# Patient Record
Sex: MALE | Race: WHITE | NOT HISPANIC OR LATINO | Employment: FULL TIME | URBAN - NONMETROPOLITAN AREA
[De-identification: names, ages, dates, MRNs, and addresses within clinical notes are randomized per-mention and may not be internally consistent; named-entity substitution may affect disease eponyms.]

---

## 2022-04-26 ENCOUNTER — HOSPITAL ENCOUNTER (EMERGENCY)
Facility: HOSPITAL | Age: 22
Discharge: HOME | End: 2022-04-26
Attending: EMERGENCY MEDICINE | Admitting: EMERGENCY MEDICINE
Payer: COMMERCIAL

## 2022-04-26 VITALS
RESPIRATION RATE: 18 BRPM | HEART RATE: 115 BPM | WEIGHT: 283.95 LBS | BODY MASS INDEX: 38.46 KG/M2 | HEIGHT: 72 IN | DIASTOLIC BLOOD PRESSURE: 83 MMHG | OXYGEN SATURATION: 98 % | SYSTOLIC BLOOD PRESSURE: 139 MMHG | TEMPERATURE: 97.3 F

## 2022-04-26 DIAGNOSIS — M54.6 THORACIC BACK PAIN, UNSPECIFIED BACK PAIN LATERALITY, UNSPECIFIED CHRONICITY: Primary | ICD-10-CM

## 2022-04-26 PROCEDURE — 99283 EMERGENCY DEPT VISIT LOW MDM: CPT

## 2022-04-26 PROCEDURE — 99282 EMERGENCY DEPT VISIT SF MDM: CPT | Performed by: EMERGENCY MEDICINE

## 2022-04-26 RX ORDER — ACETAMINOPHEN 325 MG/1
650 TABLET ORAL ONCE
Status: COMPLETED | OUTPATIENT
Start: 2022-04-26 | End: 2022-04-26

## 2022-04-26 RX ORDER — LIDOCAINE 50 MG/G
1 PATCH TOPICAL ONCE
Status: DISCONTINUED | OUTPATIENT
Start: 2022-04-26 | End: 2022-04-27 | Stop reason: HOSPADM

## 2022-04-26 RX ORDER — LIDOCAINE 50 MG/G
1 PATCH TOPICAL DAILY
Qty: 10 PATCH | Refills: 0 | Status: SHIPPED | OUTPATIENT
Start: 2022-04-26

## 2022-04-26 RX ADMIN — ACETAMINOPHEN 650 MG: 325 TABLET ORAL at 22:03

## 2022-04-26 RX ADMIN — LIDOCAINE 5% 1 PATCH: 700 PATCH TOPICAL at 22:13

## 2022-04-26 NOTE — Clinical Note
Tyrese Fonseca was seen and treated in our emergency department on 4/26/2022  Diagnosis:     Kim Vick  may return to work on return date  He may return on this date: 04/27/2022         If you have any questions or concerns, please don't hesitate to call        Anabella Mosquera MD    ______________________________           _______________          _______________  Hospital Representative                              Date                                Time

## 2022-04-27 NOTE — DISCHARGE INSTRUCTIONS
Thank you for letting us take care of you  You have been evaluated for back pain  Please follow-up as instructed  Please return for worsening symptoms  At this time, you have no clinical evidence of symptoms or problems that will require hospitalization, however you should be evaluated soon by a primary care physician, and contact information has been provided  Follow up with your primary care physician  This is important as many medical conditions can be managed as an outpatient, in addition to routine health screening  Seeing your primary doctor often can help identify changes in the medical issue that brought you to the ED for care today  If you experience any new symptoms or acute worsening of current symptoms, please return to the ED

## 2022-04-27 NOTE — ED PROVIDER NOTES
History  Chief Complaint   Patient presents with    Back Pain     Patient complaining of back pain between shoulder blades for 3 days   76-year-old male without pertinent past medical history who presents for back pain between the shoulder blades for the last 3 days  States he has no injuries or trauma  Denies any heavy lifting  States pain is worse when he tries to twist his back and believes he may have a muscle sprain or strain  Did have back issues last year but it was in the lumbar back  Denies any numbness or tingling  No weakness  Denies any headache  States that when he told work he would not be able to come in due to back pain, they instructed that he would need a doctor's note prompting him to come here  Did not take anything for pain prior to arrival   No bowel or bladder incontinence  No saddle anesthesia  Denies any previous IV drug use  No other concerns  None       History reviewed  No pertinent past medical history  History reviewed  No pertinent surgical history  History reviewed  No pertinent family history  I have reviewed and agree with the history as documented  E-Cigarette/Vaping    E-Cigarette Use Current Every Day User      E-Cigarette/Vaping Substances     Social History     Tobacco Use    Smoking status: Never Smoker    Smokeless tobacco: Never Used   Vaping Use    Vaping Use: Every day   Substance Use Topics    Alcohol use: Not Currently    Drug use: Not Currently       Review of Systems   Constitutional: Negative for activity change, appetite change, chills, diaphoresis and fever  HENT: Negative for congestion, rhinorrhea and sore throat  Eyes: Negative for visual disturbance  Respiratory: Negative for chest tightness and shortness of breath  Cardiovascular: Negative for chest pain, palpitations and leg swelling  Gastrointestinal: Negative for abdominal pain, constipation, diarrhea, nausea and vomiting     Genitourinary: Negative for difficulty urinating and hematuria  Musculoskeletal: Positive for back pain  Negative for gait problem, joint swelling and neck pain  Skin: Negative for color change and rash  Neurological: Negative for dizziness, weakness and headaches  Psychiatric/Behavioral: Negative for behavioral problems  Physical Exam  Physical Exam  Vitals and nursing note reviewed  Constitutional:       General: He is not in acute distress  Appearance: He is well-developed  He is not diaphoretic  HENT:      Head: Normocephalic and atraumatic  Right Ear: External ear normal       Left Ear: External ear normal       Nose: Nose normal    Eyes:      Pupils: Pupils are equal, round, and reactive to light  Cardiovascular:      Rate and Rhythm: Regular rhythm  Tachycardia present  Pulses: Normal pulses  Heart sounds: Normal heart sounds  Pulmonary:      Effort: Pulmonary effort is normal  No respiratory distress  Breath sounds: Normal breath sounds  No wheezing or rales  Abdominal:      General: Bowel sounds are normal       Palpations: Abdomen is soft  There is no mass  Tenderness: There is no abdominal tenderness  Musculoskeletal:         General: Tenderness present  No deformity  Normal range of motion  Cervical back: Normal range of motion and neck supple  Comments: No midline spinal tenderness to palpation; patient has pain in the paraspinal muscles in the thoracic region; worse pain with movement and with twisting reported  No rashes noted  Skin:     General: Skin is warm and dry  Capillary Refill: Capillary refill takes less than 2 seconds  Findings: No erythema or rash  Comments: 2+ radial pulses bilaterally     Neurological:      General: No focal deficit present  Mental Status: He is alert  Mental status is at baseline  Cranial Nerves: No cranial nerve deficit  Sensory: No sensory deficit  Motor: No weakness or abnormal muscle tone  Comments: 5/5 strength in upper extremities bilaterally; normal sensation throughout the upper extremities bilaterally  Psychiatric:         Behavior: Behavior normal          Vital Signs  ED Triage Vitals [04/26/22 2154]   Temperature Pulse Respirations Blood Pressure SpO2   (!) 97 3 °F (36 3 °C) (!) 115 18 139/83 98 %      Temp Source Heart Rate Source Patient Position - Orthostatic VS BP Location FiO2 (%)   Temporal Monitor Sitting Left arm --      Pain Score       7           Vitals:    04/26/22 2154   BP: 139/83   Pulse: (!) 115   Patient Position - Orthostatic VS: Sitting         Visual Acuity      ED Medications  Medications   lidocaine (LIDODERM) 5 % patch 1 patch (has no administration in time range)   acetaminophen (TYLENOL) tablet 650 mg (650 mg Oral Given 4/26/22 2203)       Diagnostic Studies  Results Reviewed     None                 No orders to display              Procedures  Procedures         ED Course         MDM  Number of Diagnoses or Management Options  Thoracic back pain, unspecified back pain laterality, unspecified chronicity  Diagnosis management comments: 81-year-old male without pertinent past medical history who presents for back pain between the shoulder blades for the last 3 days  Vital signs on arrival here were notable for mild tachycardia but otherwise unremarkable vitals  Patient has exam as above  No red flag signs or symptoms  Exam unremarkable work and likely muscle strain/sprain  No imaging indicated  Patient was given Tylenol and Lidoderm patch here  Advised continue NSAIDs and Tylenol at home  Lidoderm patches prescribed patient given work note  Understands return precautions         Amount and/or Complexity of Data Reviewed  Review and summarize past medical records: yes    Risk of Complications, Morbidity, and/or Mortality  Presenting problems: low  Diagnostic procedures: low  Management options: low        Disposition  Final diagnoses:   Thoracic back pain, unspecified back pain laterality, unspecified chronicity     Time reflects when diagnosis was documented in both MDM as applicable and the Disposition within this note     Time User Action Codes Description Comment    4/26/2022  9:56 PM Beny Harrell Add [M54 6] Thoracic back pain, unspecified back pain laterality, unspecified chronicity       ED Disposition     ED Disposition Condition Date/Time Comment    Discharge Stable Tue Apr 26, 2022  9:56 PM Kristal Sinclair discharge to home/self care  Follow-up Information     Follow up With Specialties Details Why Contact Info    Your Family Physician              Patient's Medications   Discharge Prescriptions    LIDOCAINE (LIDODERM) 5 %    Apply 1 patch topically daily Remove & Discard patch within 12 hours or as directed by MD       Start Date: 4/26/2022 End Date: --       Order Dose: 1 patch       Quantity: 10 patch    Refills: 0       No discharge procedures on file      PDMP Review     None          ED Provider  Electronically Signed by           Donna Dorsey MD  04/26/22 2200

## 2022-08-16 ENCOUNTER — HOSPITAL ENCOUNTER (EMERGENCY)
Facility: HOSPITAL | Age: 22
Discharge: HOME | End: 2022-08-16
Attending: EMERGENCY MEDICINE | Admitting: EMERGENCY MEDICINE
Payer: COMMERCIAL

## 2022-08-16 VITALS
RESPIRATION RATE: 16 BRPM | TEMPERATURE: 98 F | SYSTOLIC BLOOD PRESSURE: 159 MMHG | OXYGEN SATURATION: 98 % | DIASTOLIC BLOOD PRESSURE: 94 MMHG | WEIGHT: 285 LBS | HEART RATE: 98 BPM | BODY MASS INDEX: 38.65 KG/M2

## 2022-08-16 DIAGNOSIS — M79.645 THUMB PAIN, LEFT: Primary | ICD-10-CM

## 2022-08-16 PROCEDURE — 99282 EMERGENCY DEPT VISIT SF MDM: CPT | Performed by: EMERGENCY MEDICINE

## 2022-08-16 PROCEDURE — 99283 EMERGENCY DEPT VISIT LOW MDM: CPT

## 2022-08-16 NOTE — Clinical Note
Shamatristan Fernandez was seen and treated in our emergency department on 8/16/2022  Diagnosis:     Veda Chavez  may return to work on return date  He may return on this date: 08/17/2022         If you have any questions or concerns, please don't hesitate to call        Pina Villeda, DO    ______________________________           _______________          _______________  Hospital Representative                              Date                                Time

## 2022-08-16 NOTE — ED PROVIDER NOTES
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 Taking?   lidocaine (Lidoderm) 5 %   No No   Sig: Apply 1 patch topically daily Remove & Discard patch within 12 hours or as directed by MD      Facility-Administered Medications: None       History reviewed  No pertinent past medical history  History reviewed  No pertinent surgical history  History reviewed  No pertinent family history  I have reviewed and agree with the history as documented  E-Cigarette/Vaping    E-Cigarette Use Current Every Day User      E-Cigarette/Vaping Substances     Social History     Tobacco Use    Smoking status: Never Smoker    Smokeless tobacco: Never Used   Vaping Use    Vaping Use: Every day   Substance Use Topics    Alcohol use: Not Currently    Drug use: Not Currently       Review of Systems   Constitutional: Negative  HENT: Negative  Eyes: Negative  Respiratory: Negative  Cardiovascular: Negative  Gastrointestinal: Negative  Endocrine: Negative  Genitourinary: Negative  Musculoskeletal: Positive for arthralgias and myalgias  Skin: Negative  Allergic/Immunologic: Negative  Neurological: Negative  Hematological: Negative  Psychiatric/Behavioral: Negative  Physical Exam  Physical Exam  Constitutional:       Appearance: He is well-developed  HENT:      Head: Normocephalic and atraumatic  Eyes:      Conjunctiva/sclera: Conjunctivae normal       Pupils: Pupils are equal, round, and reactive to light  Cardiovascular:      Rate and Rhythm: Normal rate  Pulmonary:      Effort: Pulmonary effort is normal    Abdominal:      Palpations: Abdomen is soft  Musculoskeletal:         General: Normal range of motion  Arms:       Cervical back: Normal range of motion and neck supple  Comments: Tenderness to palpate at the base of the left thumb, both dorsally and on the palmar surface, there is no bony deformity, distal sensation and motor is intact with brisk capillary refill, 2+ radial pulses, patient reports pain with range of motion testing   Skin:     General: Skin is warm and dry  Neurological:      Mental Status: He is alert and oriented to person, place, and time           Vital Signs  ED Triage Vitals [08/16/22 1456]   Temperature Pulse Respirations Blood Pressure SpO2   98 °F (36 7 °C) 98 16 159/94 98 %      Temp src Heart Rate Source Patient Position - Orthostatic VS BP Location FiO2 (%)   -- -- Lying Right arm --      Pain Score       7           Vitals:    08/16/22 1456   BP: 159/94   Pulse: 98   Patient Position - Orthostatic VS: Lying               ED Medications  Medications - No data to display    Diagnostic Studies  Results Reviewed     None                 No orders to display                   ED Course  ED Course as of 08/16/22 1534   Tue Aug 16, 2022   1533 Patient with acute exacerbation of chronic left thumb pain, has not taken anything at home for the pain, is requesting work note as he believes work exacerbated his pain, he is requesting a work note, this was provided as well as instructions to take OTC anti-inflammatories, apply ice, rest thumb until feeling better, a referral was also placed for follow-up with Sports Medicine for further evaluation and treatment, patient acknowledges understanding and agreement with this plan                                             Disposition  Final diagnoses:   Thumb pain, left     Time reflects when diagnosis was documented in both MDM as applicable and the Disposition within this note     Time User Action Codes Description Comment    8/16/2022  3:08 PM Hugo Rocha Add [A72 860] Thumb pain, left       ED Disposition     ED Disposition   Discharge    Condition   Stable    Date/Time   Tue Aug 16, 2022  3:08 PM    Comment   Lawerance Aw discharge to home/self care                 Follow-up Information     Follow up With Specialties Details Why Contact Info    Jacki Ceballos MD Sports Medicine In 2 days As needed [address]  [phone]            Discharge Medication List as of 8/16/2022  3:14 PM      CONTINUE these medications which have NOT CHANGED    Details   lidocaine (Lidoderm) 5 % Apply 1 patch topically daily Remove & Discard patch within 12 hours or as directed by MD, Starting Tue 4/26/2022, Normal                 PDMP Review     None          ED Provider  Electronically Signed by           Melissa Huertas DO  08/16/22 8108

## 2022-08-16 NOTE — Clinical Note
Ruddy Ordaz was seen and treated in our emergency department on 8/16/2022  Diagnosis:     Enio Cordova  may return to work on return date  He may return on this date: 08/17/2022         If you have any questions or concerns, please don't hesitate to call        Pinky Persaud DO    ______________________________           _______________          _______________  Hospital Representative                              Date                                Time

## 2023-02-27 ENCOUNTER — OFFICE VISIT (OUTPATIENT)
Dept: URGENT CARE | Facility: CLINIC | Age: 23
End: 2023-02-27

## 2023-02-27 VITALS
DIASTOLIC BLOOD PRESSURE: 77 MMHG | HEIGHT: 72 IN | RESPIRATION RATE: 20 BRPM | OXYGEN SATURATION: 97 % | HEART RATE: 130 BPM | BODY MASS INDEX: 35.62 KG/M2 | SYSTOLIC BLOOD PRESSURE: 129 MMHG | TEMPERATURE: 101.6 F | WEIGHT: 263 LBS

## 2023-02-27 DIAGNOSIS — R50.9 FEVER, UNSPECIFIED FEVER CAUSE: ICD-10-CM

## 2023-02-27 DIAGNOSIS — M54.50 ACUTE BILATERAL LOW BACK PAIN WITHOUT SCIATICA: Primary | ICD-10-CM

## 2023-02-27 DIAGNOSIS — K12.0 APHTHOUS ULCER: ICD-10-CM

## 2023-02-27 LAB
SARS-COV-2 AG UPPER RESP QL IA: NEGATIVE
SL AMB  POCT GLUCOSE, UA: NEGATIVE
SL AMB LEUKOCYTE ESTERASE,UA: NEGATIVE
SL AMB POCT BILIRUBIN,UA: NEGATIVE
SL AMB POCT BLOOD,UA: NEGATIVE
SL AMB POCT CLARITY,UA: CLEAR
SL AMB POCT COLOR,UA: YELLOW
SL AMB POCT KETONES,UA: NEGATIVE
SL AMB POCT NITRITE,UA: NEGATIVE
SL AMB POCT PH,UA: 7
SL AMB POCT SPECIFIC GRAVITY,UA: 1015
SL AMB POCT URINE PROTEIN: NEGATIVE
SL AMB POCT UROBILINOGEN: 0.2
VALID CONTROL: NORMAL

## 2023-02-27 RX ORDER — MELOXICAM 15 MG/1
15 TABLET ORAL DAILY
Qty: 7 TABLET | Refills: 0 | Status: SHIPPED | OUTPATIENT
Start: 2023-02-27 | End: 2023-03-06

## 2023-02-27 RX ORDER — LIDOCAINE 50 MG/G
1 PATCH TOPICAL DAILY
Qty: 10 PATCH | Refills: 0 | Status: SHIPPED | OUTPATIENT
Start: 2023-02-27

## 2023-02-27 NOTE — PATIENT INSTRUCTIONS
Fever in Adults   WHAT YOU NEED TO KNOW:   A fever is an increase in your body temperature  Normal body temperature is 98 6°F (37°C)  Fever is generally defined as greater than 100 4°F (38°C)  Common causes include an infection, injury, or disease such as arthritis  DISCHARGE INSTRUCTIONS:   Return to the emergency department if:   Your fever does not go away or gets worse even after treatment  You have a stiff neck and a bad headache  You are confused  You may not be able to think clearly or remember things like you normally do  Your heart beats faster than usual even after treatment  You have shortness of breath or chest pain when you breathe  You urinate small amounts or not at all  Your skin, lips, or nails turn blue  Contact your healthcare provider if:   You have abdominal pain or you feel bloated  You have nausea or are vomiting  You have pain or burning when you urinate, or you have pain in your back  You have questions or concerns about your condition or care  Medicines: You may need any of the following:  NSAIDs , such as ibuprofen, help decrease swelling, pain, and fever  This medicine is available with or without a doctor's order  NSAIDs can cause stomach bleeding or kidney problems in certain people  If you take blood thinner medicine, always ask if NSAIDs are safe for you  Always read the medicine label and follow directions  Do not give these medicines to children younger than 6 months without direction from a healthcare provider  Acetaminophen  decreases pain and fever  It is available without a doctor's order  Ask how much to take and how often to take it  Follow directions  Read the labels of all other medicines you are using to see if they also contain acetaminophen, or ask your doctor or pharmacist  Acetaminophen can cause liver damage if not taken correctly  Antibiotics  may be given if you have an infection caused by bacteria      Take your medicine as directed  Contact your healthcare provider if you think your medicine is not helping or if you have side effects  Tell your provider if you are allergic to any medicine  Keep a list of the medicines, vitamins, and herbs you take  Include the amounts, and when and why you take them  Bring the list or the pill bottles to follow-up visits  Carry your medicine list with you in case of an emergency  Follow up with your healthcare provider as directed:  Write down your questions so you remember to ask them during your visits  Self-care:   Drink more liquids as directed  A fever makes you sweat  This can increase your risk for dehydration  Liquids can help prevent dehydration  Drink at least 6 to 8 eight-ounce cups of clear liquids each day  Drink water, juice, or broth  Do not drink sports drinks  They may contain caffeine  Ask your healthcare provider if you should drink an oral rehydration solution (ORS)  An ORS has the right amounts of water, salts, and sugar you need to replace body fluids  Dress in lightweight clothes  Shivers may be a sign that your fever is rising  Do not put extra blankets or clothes on  This may cause your fever to rise even higher  Dress in light, comfortable clothing  Use a lightweight blanket or sheet when you sleep  Change your clothes, blanket, or sheets if they get wet  Cool yourself safely  Take a bath in cool or lukewarm water  Use an ice pack wrapped in a small towel or wet a washcloth with cool water  Place the ice pack or wet washcloth on your forehead or the back of your neck  © Copyright Graham [employer] 2022 Information is for End User's use only and may not be sold, redistributed or otherwise used for commercial purposes  The above information is an  only  It is not intended as medical advice for individual conditions or treatments   Talk to your doctor, nurse or pharmacist before following any medical regimen to see if it is safe and effective for you   Acute Low Back Pain   WHAT YOU NEED TO KNOW:   Acute low back pain is sudden discomfort that lasts up to 6 weeks and makes activity difficult  DISCHARGE INSTRUCTIONS:   Return to the emergency department if:   You have severe pain  You have sudden stiffness and heaviness on both buttocks down to both legs  You have numbness or weakness in one leg, or pain in both legs  You have numbness in your genital area or across your lower back  You cannot control your urine or bowel movements  Call your doctor if:   You have a fever  You have pain at night or when you rest     Your pain does not get better with treatment  You have pain that worsens when you cough or sneeze  You suddenly feel something pop or snap in your back  You have questions or concerns about your condition or care  Medicines: You may need any of the following:  NSAIDs , such as ibuprofen, help decrease swelling, pain, and fever  This medicine is available with or without a doctor's order  NSAIDs can cause stomach bleeding or kidney problems in certain people  If you take blood thinner medicine, always ask your healthcare provider if NSAIDs are safe for you  Always read the medicine label and follow directions  Acetaminophen  decreases pain and fever  It is available without a doctor's order  Ask how much to take and how often to take it  Follow directions  Read the labels of all other medicines you are using to see if they also contain acetaminophen, or ask your doctor or pharmacist  Acetaminophen can cause liver damage if not taken correctly  Muscle relaxers  decrease pain by relaxing the muscles in your lower spine  Prescription pain medicine  may be given  Ask your healthcare provider how to take this medicine safely  Some prescription pain medicines contain acetaminophen  Do not take other medicines that contain acetaminophen without talking to your healthcare provider   Too much acetaminophen may cause liver damage  Prescription pain medicine may cause constipation  Ask your healthcare provider how to prevent or treat constipation  Self-care:   Stay active  as much as you can without causing more pain  Bed rest could make your back pain worse  Start with some light exercises, such as walking  Avoid heavy lifting until your pain is gone  Ask for more information about the activities or exercises that are right for you  Apply heat  on your back for 20 to 30 minutes every 2 hours for as many days as directed  Heat helps decrease pain and muscle spasms  Alternate heat and ice  Apply ice  on your back for 15 to 20 minutes every hour or as directed  Use an ice pack, or put crushed ice in a plastic bag  Cover it with a towel before you apply it to your skin  Ice helps prevent tissue damage and decreases swelling and pain  Prevent acute low back pain:   Use proper body mechanics  Bend at the hips and knees when you  objects  Do not bend from the waist  Use your leg muscles as you lift the load  Do not use your back  Keep the object close to your chest as you lift it  Try not to twist or lift anything above your waist          Change your position often when you stand for long periods of time  Rest one foot on a small box or footrest, and then switch to the other foot often  Try not to sit for long periods of time  When you do, sit in a straight-backed chair with your feet flat on the floor  Never reach, pull, or push while you are sitting  Do exercises that strengthen your back muscles  Warm up before you exercise  Ask your healthcare provider the best exercises for you  Maintain a healthy weight  Ask your healthcare provider what a healthy weight is for you  Ask him or her to help you create a weight loss plan if you are overweight  Follow up with your doctor as directed:  Return for a follow-up visit if you still have pain after 1 to 3 weeks of treatment   You may need to visit an orthopedist if your back pain lasts longer than 12 weeks  Write down your questions so you remember to ask them during your visits  © Copyright Jean-Paul [employer]ender 2022 Information is for End User's use only and may not be sold, redistributed or otherwise used for commercial purposes  The above information is an  only  It is not intended as medical advice for individual conditions or treatments  Talk to your doctor, nurse or pharmacist before following any medical regimen to see if it is safe and effective for you

## 2023-02-27 NOTE — LETTER
February 27, 2023     Patient: Neto West   YOB: 2000   Date of Visit: 2/27/2023       To Whom it May Concern:    Hector Tatyana was seen in my clinic on 2/27/2023  He may return to work on 02/28/2023  If you have any questions or concerns, please don't hesitate to call           Sincerely,          Roc Hanson PA-C        CC: No Recipients

## 2023-02-27 NOTE — PROGRESS NOTES
[address] [facility]        NAME: Chery Sanabria is a 25 y o  male  : 2000    MRN: [id number]  DATE: 2023  TIME: 3:19 PM    Assessment and Plan   Acute bilateral low back pain without sciatica [M54 50]  1  Acute bilateral low back pain without sciatica  POCT urine dip    lidocaine (Lidoderm) 5 %    meloxicam (Mobic) 15 mg tablet      2  Fever, unspecified fever cause  Poct Covid 19 Rapid Antigen Test      3  Aphthous ulcer              Patient Instructions   Patient Instructions     Fever in Adults   WHAT YOU NEED TO KNOW:   A fever is an increase in your body temperature  Normal body temperature is 98 6°F (37°C)  Fever is generally defined as greater than 100 4°F (38°C)  Common causes include an infection, injury, or disease such as arthritis  DISCHARGE INSTRUCTIONS:   Return to the emergency department if:   • Your fever does not go away or gets worse even after treatment  • You have a stiff neck and a bad headache  • You are confused  You may not be able to think clearly or remember things like you normally do  • Your heart beats faster than usual even after treatment  • You have shortness of breath or chest pain when you breathe  • You urinate small amounts or not at all  • Your skin, lips, or nails turn blue  Contact your healthcare provider if:   • You have abdominal pain or you feel bloated  • You have nausea or are vomiting  • You have pain or burning when you urinate, or you have pain in your back  • You have questions or concerns about your condition or care  Medicines: You may need any of the following:  • NSAIDs , such as ibuprofen, help decrease swelling, pain, and fever  This medicine is available with or without a doctor's order  NSAIDs can cause stomach bleeding or kidney problems in certain people  If you take blood thinner medicine, always ask if NSAIDs are safe for you  Always read the medicine label and follow directions   Do not give these medicines to children younger than 6 months without direction from a healthcare provider  • Acetaminophen  decreases pain and fever  It is available without a doctor's order  Ask how much to take and how often to take it  Follow directions  Read the labels of all other medicines you are using to see if they also contain acetaminophen, or ask your doctor or pharmacist  Acetaminophen can cause liver damage if not taken correctly  • Antibiotics  may be given if you have an infection caused by bacteria  • Take your medicine as directed  Contact your healthcare provider if you think your medicine is not helping or if you have side effects  Tell your provider if you are allergic to any medicine  Keep a list of the medicines, vitamins, and herbs you take  Include the amounts, and when and why you take them  Bring the list or the pill bottles to follow-up visits  Carry your medicine list with you in case of an emergency  Follow up with your healthcare provider as directed:  Write down your questions so you remember to ask them during your visits  Self-care:   1  Drink more liquids as directed  A fever makes you sweat  This can increase your risk for dehydration  Liquids can help prevent dehydration  ? Drink at least 6 to 8 eight-ounce cups of clear liquids each day  Drink water, juice, or broth  Do not drink sports drinks  They may contain caffeine  ? Ask your healthcare provider if you should drink an oral rehydration solution (ORS)  An ORS has the right amounts of water, salts, and sugar you need to replace body fluids  2  Dress in lightweight clothes  Shivers may be a sign that your fever is rising  Do not put extra blankets or clothes on  This may cause your fever to rise even higher  Dress in light, comfortable clothing  Use a lightweight blanket or sheet when you sleep  Change your clothes, blanket, or sheets if they get wet  3  Cool yourself safely    Take a bath in cool or lukewarm water  Use an ice pack wrapped in a small towel or wet a washcloth with cool water  Place the ice pack or wet washcloth on your forehead or the back of your neck  © Copyright [employer] 2022 Information is for End User's use only and may not be sold, redistributed or otherwise used for commercial purposes  The above information is an  only  It is not intended as medical advice for individual conditions or treatments  Talk to your doctor, nurse or pharmacist before following any medical regimen to see if it is safe and effective for you  Acute Low Back Pain   WHAT YOU NEED TO KNOW:   Acute low back pain is sudden discomfort that lasts up to 6 weeks and makes activity difficult  DISCHARGE INSTRUCTIONS:   Return to the emergency department if:   • You have severe pain  • You have sudden stiffness and heaviness on both buttocks down to both legs  • You have numbness or weakness in one leg, or pain in both legs  • You have numbness in your genital area or across your lower back  • You cannot control your urine or bowel movements  Call your doctor if:   • You have a fever  • You have pain at night or when you rest     • Your pain does not get better with treatment  • You have pain that worsens when you cough or sneeze  • You suddenly feel something pop or snap in your back  • You have questions or concerns about your condition or care  Medicines: You may need any of the following:  • NSAIDs , such as ibuprofen, help decrease swelling, pain, and fever  This medicine is available with or without a doctor's order  NSAIDs can cause stomach bleeding or kidney problems in certain people  If you take blood thinner medicine, always ask your healthcare provider if NSAIDs are safe for you  Always read the medicine label and follow directions  • Acetaminophen  decreases pain and fever  It is available without a doctor's order  Ask how much to take and how often to take it   Follow directions  Read the labels of all other medicines you are using to see if they also contain acetaminophen, or ask your doctor or pharmacist  Acetaminophen can cause liver damage if not taken correctly  • Muscle relaxers  decrease pain by relaxing the muscles in your lower spine  • Prescription pain medicine  may be given  Ask your healthcare provider how to take this medicine safely  Some prescription pain medicines contain acetaminophen  Do not take other medicines that contain acetaminophen without talking to your healthcare provider  Too much acetaminophen may cause liver damage  Prescription pain medicine may cause constipation  Ask your healthcare provider how to prevent or treat constipation  Self-care:   • Stay active  as much as you can without causing more pain  Bed rest could make your back pain worse  Start with some light exercises, such as walking  Avoid heavy lifting until your pain is gone  Ask for more information about the activities or exercises that are right for you  • Apply heat  on your back for 20 to 30 minutes every 2 hours for as many days as directed  Heat helps decrease pain and muscle spasms  Alternate heat and ice  • Apply ice  on your back for 15 to 20 minutes every hour or as directed  Use an ice pack, or put crushed ice in a plastic bag  Cover it with a towel before you apply it to your skin  Ice helps prevent tissue damage and decreases swelling and pain  Prevent acute low back pain:   1  Use proper body mechanics  ? Bend at the hips and knees when you  objects  Do not bend from the waist  Use your leg muscles as you lift the load  Do not use your back  Keep the object close to your chest as you lift it  Try not to twist or lift anything above your waist          ? Change your position often when you stand for long periods of time  Rest one foot on a small box or footrest, and then switch to the other foot often      ? Try not to sit for long periods of time  When you do, sit in a straight-backed chair with your feet flat on the floor  Never reach, pull, or push while you are sitting  2  Do exercises that strengthen your back muscles  Warm up before you exercise  Ask your healthcare provider the best exercises for you  3  Maintain a healthy weight  Ask your healthcare provider what a healthy weight is for you  Ask him or her to help you create a weight loss plan if you are overweight  Follow up with your doctor as directed:  Return for a follow-up visit if you still have pain after 1 to 3 weeks of treatment  You may need to visit an orthopedist if your back pain lasts longer than 12 weeks  Write down your questions so you remember to ask them during your visits  © Copyright [employer] 2022 Information is for End User's use only and may not be sold, redistributed or otherwise used for commercial purposes  The above information is an  only  It is not intended as medical advice for individual conditions or treatments  Talk to your doctor, nurse or pharmacist before following any medical regimen to see if it is safe and effective for you  Follow up with PCP in 3-5 days  Proceed to  ER if symptoms worsen  Chief Complaint     Chief Complaint   Patient presents with   • Back Pain         History of Present Illness       The patient is a 42-year-old male with past medical history significant for attention deficit disorder and marijuana use who presents to the clinic complaining of lower back pain that started last night  He states that he noticed the pain after he was splitting wood at his house  The patient describes the pain as a sharp pain located in the center of his lower back  He states the pain is intermittent and seems to be worse with movement  He states the pain does not radiate across his back or down his legs    He was also noted to have a fever when he came into the office but currently denies any URI symptoms other than a slight stuffy nose  He currently denies cough, chest pains, shortness of breath, nausea, vomiting, diarrhea, or urinary symptoms he denies history of kidney stones or kidney infections  He states he has had upper back pain in the past which he was seen in the ER for and was given Lidoderm which did seem to help with his symptoms  He denies other drug use and states that he has not used marijuana in several weeks  Review of Systems   Review of Systems   Constitutional: Positive for chills  Negative for fever  HENT: Positive for congestion  Negative for ear pain, hearing loss, mouth sores, nosebleeds, postnasal drip, rhinorrhea, sinus pressure, sinus pain and sore throat  He is complaining about an ulcer in his mouth  Eyes: Negative for pain and visual disturbance  Respiratory: Negative for cough, shortness of breath, wheezing and stridor  Cardiovascular: Negative for chest pain and palpitations  Gastrointestinal: Negative for abdominal pain, anal bleeding, blood in stool, constipation, diarrhea, nausea, rectal pain and vomiting  Genitourinary: Negative for difficulty urinating, dysuria, enuresis, flank pain, frequency, genital sores and hematuria  Musculoskeletal: Positive for back pain  Negative for arthralgias, myalgias, neck pain and neck stiffness  Skin: Negative for color change and rash  Neurological: Negative for seizures and syncope  All other systems reviewed and are negative          Current Medications       Current Outpatient Medications:   •  lidocaine (Lidoderm) 5 %, Apply 1 patch topically over 12 hours daily Remove & Discard patch within 12 hours or as directed by MD, Disp: 10 patch, Rfl: 0  •  meloxicam (Mobic) 15 mg tablet, Take 1 tablet (15 mg total) by mouth daily for 7 days, Disp: 7 tablet, Rfl: 0    Current Allergies     Allergies as of 02/27/2023   • (No Known Allergies)            The following portions of the patient's history were reviewed and updated as appropriate: allergies, current medications, past family history, past medical history, past social history, past surgical history and problem list      History reviewed  No pertinent past medical history  History reviewed  No pertinent surgical history  History reviewed  No pertinent family history  Medications have been verified  Objective   /77   Pulse (!) 130   Temp (!) 101 6 °F (38 7 °C)   Resp 20   Ht 6' (1 829 m)   Wt 119 kg (263 lb)   SpO2 97%   BMI 35 67 kg/m²        Physical Exam     Physical Exam  Constitutional:       General: He is not in acute distress  Appearance: He is well-developed  He is diaphoretic  He is not ill-appearing or toxic-appearing  Comments: The patient is febrile and diaphoretic  He did is not appear in any distress  He does not currently appear toxic   HENT:      Head: Normocephalic  Right Ear: Tympanic membrane normal       Left Ear: Tympanic membrane normal       Nose: Congestion present  Mouth/Throat:      Tongue: Lesions present  Pharynx: No oropharyngeal exudate or posterior oropharyngeal erythema  Comments: - There is a small aphthous ulcer noted on the right tongue  There is no abscess or signs of infection noted in this area that could be accounting for the patient's fever  There are no signs of tonsillar abscess or other ulcers or infection in the mouth  Eyes:      General:         Left eye: No discharge  Pupils: Pupils are equal, round, and reactive to light  Neck:      Thyroid: No thyromegaly  Trachea: No tracheal deviation  Cardiovascular:      Rate and Rhythm: Regular rhythm  Tachycardia present  Heart sounds: No murmur heard  Pulmonary:      Effort: Pulmonary effort is normal  No respiratory distress  Breath sounds: No wheezing or rales  Chest:      Chest wall: No tenderness  Abdominal:      General: Bowel sounds are normal  There is no distension  Palpations: Abdomen is soft  There is no mass  Tenderness: There is generalized abdominal tenderness  There is right CVA tenderness  There is no guarding or rebound  Negative signs include Ivory's sign, Rovsing's sign, McBurney's sign, psoas sign and obturator sign  Comments: Mild bilateral flank pain but no significant guarding or rebound tenderness  Musculoskeletal:         General: Normal range of motion  Cervical back: Normal range of motion  Skin:     General: Skin is warm  Neurological:      Mental Status: He is alert            -Since the patient states that the Lidoderm helped with his symptoms for his back pain in the past I will refill his Lidoderm  I will give him Mobic for his back pain as well  The source of the patient's fever is unknown at this time  His COVID test came back negative his urine looks normal   I suggest that he monitor his symptoms  He will need further work-up for his fever if he develops any further symptoms  He was instructed to follow-up with his primary care doctor or go to the ER if symptoms worsen

## 2023-04-24 ENCOUNTER — CONSULT (OUTPATIENT)
Dept: FAMILY MEDICINE CLINIC | Facility: CLINIC | Age: 23
End: 2023-04-24

## 2023-04-24 VITALS
SYSTOLIC BLOOD PRESSURE: 134 MMHG | BODY MASS INDEX: 36.16 KG/M2 | DIASTOLIC BLOOD PRESSURE: 82 MMHG | HEIGHT: 72 IN | WEIGHT: 266.98 LBS | HEART RATE: 100 BPM | TEMPERATURE: 98.5 F | OXYGEN SATURATION: 98 %

## 2023-04-24 DIAGNOSIS — Z23 NEED FOR HPV VACCINE: ICD-10-CM

## 2023-04-24 DIAGNOSIS — Z00.00 ANNUAL PHYSICAL EXAM: ICD-10-CM

## 2023-04-24 DIAGNOSIS — Z91.89 RISK OF EXPOSURE TO LYME DISEASE: ICD-10-CM

## 2023-04-24 DIAGNOSIS — G51.0 BELL'S PALSY: ICD-10-CM

## 2023-04-24 DIAGNOSIS — Z11.4 SCREENING FOR HIV (HUMAN IMMUNODEFICIENCY VIRUS): ICD-10-CM

## 2023-04-24 DIAGNOSIS — R03.0 ELEVATED BLOOD PRESSURE READING IN OFFICE WITHOUT DIAGNOSIS OF HYPERTENSION: ICD-10-CM

## 2023-04-24 DIAGNOSIS — Z11.59 NEED FOR HEPATITIS C SCREENING TEST: Primary | ICD-10-CM

## 2023-04-24 NOTE — LETTER
April 24, 2023     Patient: Charles Scott  YOB: 2000  Date of Visit: 4/24/2023      To Whom it May Concern:    Clarice Richter is under my professional care  Tanyazakia Londono was seen in my office on 4/24/2023  Tanya Londono may return to work on April 25, 2023  If you have any questions or concerns, please don't hesitate to call           Sincerely,          Romy Dangelo, PA-C          CC: No Recipients

## 2023-04-24 NOTE — PATIENT INSTRUCTIONS
And is here to establish care and have his annual physical   His blood pressure is slightly high at 134/82 and it was high on a previous exam   He is going to keep an eye on his blood pressure and he will contact me if the blood pressure is higher than 130/80  We can start him on medication if the blood pressure is consistently above this  Preventing complications from hypertension means that we need to be aggressive in treating his blood pressure  It is more important than what his blood pressure is outside the office rather than in the office  He received Gardasil vaccine against human papilloma virus today  He will need a nurse visit in 2 months to receive the second vaccine and to complete the series for a male  Patient is doing well he needs to lose 15 to 20 pounds to normalize his body mass index  He is already lost considerable amount of weight and I am encouraging him through better food choices less snacking less calories and increased activity especially walking his  son  I will see him in 1 year or sooner if needed  He is going to get his hepatitis C and his HIV one-time screening and no other labs are necessary at this time

## 2023-04-25 NOTE — PROGRESS NOTES
Assessment/Plan:       1  Need for hepatitis C screening test  -     Hepatitis C Antibody; Future    2  Annual physical exam    3  Bell's palsy  -     Ambulatory Referral to [facility]    4  Risk of exposure to Lyme disease  -     Ambulatory Referral to [facility]    5  Screening for HIV (human immunodeficiency virus)  -     HIV 1/2 AG/AB w Reflex [facility] for 2 yr old and above; Future    6  BMI 36 0-36 9,adult    7  Need for HPV vaccine  -     HPV VACCINE 9 VALENT IM    8  Elevated blood pressure reading in office without diagnosis of hypertension      This 20-year-old male is seen as a new patient in this practice  Patient recently became the father of a 3week-old son  He had been seen in the ER on 4/8/2023 due to new onset of Bell's palsy  He had noted a tick on his leg that was crawling and still alive and unattached  He had Lyme antibodies drawn and he was negative for any Lyme disease  He did receive doxycycline which she completed 1 week course  He never developed any heart complications or any joint complications  His Bell's palsy continued to improve and he was able to wrinkle his forehead, close his eye and smile  Taste to the anterior two thirds of the tongue has not completely returned but there is some improvement  Patient is willing to have hepatitis C and HIV screening which she never had  He did receive his first HPV vaccine and will return to the office for a nurse visit in 2 months to have his second vaccine performed  Patient did have an elevated blood pressure reading of 134/82 and 1 in the ER his blood pressure was significantly elevated  He is going to assess his blood pressure as an outpatient and will send me his blood pressure readings via [employer] so that we can assure that his blood pressure outside of the office is controlled  Patient's BMI is elevated at 36  BMI is above normal  Nutrition recommendations include reducing portion sizes, decreasing overall calorie intake, consuming healthier snacks and moderation in carbohydrate intake  Exercise recommendations include moderate aerobic physical activity for 150 minutes/week  He has lost weight in the past as he previously weighed 280 pounds  He will be walking his baby in a carriage getting increased activity  Patient continues to vape nicotine  He is a non-smoker  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  A total of 45 minutes was spent rendering care for this patient  This time included review of the patient's electronic medical record, performing the history and physical, reviewing appropriate labs and/or images, developing a treatment and assessment plan, answering patient's questions and concerns, and documenting the patient visit  He will come in for his second HPV vaccine in 2 months and I will repeat his annual exam in 1 year providing that his blood pressure remains under adequate control  Subjective:      Patient ID: Leticia Rojas is a 25 y o  male  HPI: This 78-year-old male is seen as a new patient  He would like to get more weight off as a goal   He has lost proximately 14 pounds through better food choices  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 and that it is hard being around this excellent food do not partake in the food but he is making better food choices  His fiancée is breast-feeding and she is trying to make healthier food choices so he is going to be eating better also as a result  He is absolutely thrilled having his first child and 3year-old son named Cuca Bro  He is working at a physical job bringing food in and replacing vending machine needs  He will be walking his baby outside and this will also increase his activity  He is not having any pain in his chest or any heart palpitations  He does not have a history of hypertension but there is a strong family history of hypertension in his family    He had had some joint issues in the past but he is no longer taking the Mobic  He is not taking any lidocaine patches for any pain  He did have some problems with dry eyes but this has resolved without treatment  He is not having any ear issues any tinnitus or vertigo  He denies difficulty swallowing  He notes that he has been having more frequent bowel movements but not diarrhea  He feels he is getting more roughage in his diet and that this is the reason for the improvement in his bowels  He denies any difficulty with erectile dysfunction  He denies any issues passing his water  He currently has no joint complaints and he has no peripheral edema  He is almost fully recovered from his Bell's palsy  No etiology has been found  He was treated for Lyme disease which she actually did not have  He had no vesicles that were noted  Only deficit is full taste returning to the anterior two thirds of his tongue  The following portions of the patient's history were reviewed and updated as appropriate: allergies, current medications, past family history, past medical history, past social history, past surgical history, and problem list     Review of Systems  No recent URI or viral syndrome  No headaches  No visual changes  Objective:      /82 (BP Location: Left arm, Patient Position: Sitting)   Pulse 100   Temp 98 5 °F (36 9 °C) (Tympanic)   Ht 6' (1 829 m)   Wt 121 kg (266 lb 15 6 oz)   SpO2 98%   BMI 36 21 kg/m²          Physical Exam  Well-developed well-nourished 25y o  year old male who is cooperative with the exam   Patient is alert and oriented x3  Patient is appropriate in answering all questions  HEENT:  Normocephalic  PERRLA  EOMs intact  TMs are clear with identification of bony landmarks  No signs of any vesicles that could be seen with a condition called Evant Hunt syndrome  He is able to wrinkle his forehead and close his eyes and smile symmetrically  No tragus or pinnae tenderness  No pre or posterior auricular adenopathy  Sinuses without tenderness  Throat without hyperemia  Neck:  Supple without adenopathy  Thyroid midline without thyromegaly or bruits  No carotid bruits  Chest symmetric and nontender  Heart regular rate and rhythm  No murmur rubs or gallops  Point of maximum impulse not displaced  Lungs are clear to auscultation  Breathing is nonlabored  Aerating bases well  Abdomen round and soft positive bowel sounds without masses tenderness or organomegaly  Extremities reveal adequate peripheral pulses without peripheral edema

## 2023-12-20 ENCOUNTER — HOSPITAL ENCOUNTER (EMERGENCY)
Facility: HOSPITAL | Age: 23
Discharge: HOME | End: 2023-12-20
Attending: EMERGENCY MEDICINE
Payer: COMMERCIAL

## 2023-12-20 VITALS
SYSTOLIC BLOOD PRESSURE: 180 MMHG | WEIGHT: 265 LBS | HEART RATE: 90 BPM | RESPIRATION RATE: 18 BRPM | DIASTOLIC BLOOD PRESSURE: 98 MMHG | HEIGHT: 72 IN | BODY MASS INDEX: 35.89 KG/M2 | OXYGEN SATURATION: 98 % | TEMPERATURE: 98 F

## 2023-12-20 DIAGNOSIS — J06.9 VIRAL URI: Primary | ICD-10-CM

## 2023-12-20 LAB
FLUAV RNA RESP QL NAA+PROBE: NEGATIVE
FLUBV RNA RESP QL NAA+PROBE: NEGATIVE
RSV RNA RESP QL NAA+PROBE: POSITIVE
SARS-COV-2 RNA RESP QL NAA+PROBE: NEGATIVE

## 2023-12-20 PROCEDURE — 99284 EMERGENCY DEPT VISIT MOD MDM: CPT | Performed by: EMERGENCY MEDICINE

## 2023-12-20 PROCEDURE — 0241U HB NFCT DS VIR RESP RNA 4 TRGT: CPT | Performed by: EMERGENCY MEDICINE

## 2023-12-20 PROCEDURE — 99283 EMERGENCY DEPT VISIT LOW MDM: CPT

## 2023-12-20 NOTE — ED PROVIDER NOTES
"History  Chief Complaint   Patient presents with    URI     Daughter + for RSV; patient requesting test - needs work note    Earache     R ear pain     Patient states his daughter recently tested positive for RSV and he himself has been having URI symptoms with facial congestion, runny nose, cough, ear pain bilaterally.  He states he was most concerned about the ear pain and came in to be checked to make sure that he did not have a \"ear infection\".  No fevers.  No shortness of breath.  No other complaints.      History provided by:  Patient   used: No    URI  Presenting symptoms: congestion, cough, ear pain, rhinorrhea and sore throat    Presenting symptoms: no fever    Severity:  Moderate  Onset quality:  Gradual  Duration:  2 days  Timing:  Constant  Progression:  Unchanged  Chronicity:  New  Relieved by:  Nothing  Worsened by:  Nothing  Ineffective treatments:  None tried  Associated symptoms: no headaches, no myalgias, no neck pain, no sinus pain and no wheezing    Earache  Location:  Bilateral  Behind ear:  No abnormality  Quality:  Dull  Severity:  Mild  Onset quality:  Gradual  Duration:  2 days  Timing:  Constant  Progression:  Unchanged  Chronicity:  New  Relieved by:  Nothing  Worsened by:  Nothing  Associated symptoms: congestion, cough, rhinorrhea and sore throat    Associated symptoms: no abdominal pain, no diarrhea, no fever, no headaches, no hearing loss, no neck pain, no rash and no vomiting        None       History reviewed. No pertinent past medical history.    History reviewed. No pertinent surgical history.    History reviewed. No pertinent family history.  I have reviewed and agree with the history as documented.    E-Cigarette/Vaping    E-Cigarette Use Current Every Day User      E-Cigarette/Vaping Substances     Social History     Tobacco Use    Smoking status: Never    Smokeless tobacco: Never   Vaping Use    Vaping status: Every Day   Substance Use Topics    Alcohol use: " Yes     Comment: social    Drug use: Never       Review of Systems   Constitutional:  Negative for chills and fever.   HENT:  Positive for congestion, ear pain, rhinorrhea and sore throat. Negative for hearing loss, sinus pain, trouble swallowing and voice change.    Eyes:  Negative for pain and discharge.   Respiratory:  Positive for cough. Negative for shortness of breath and wheezing.    Cardiovascular:  Negative for chest pain and palpitations.   Gastrointestinal:  Negative for abdominal pain, blood in stool, constipation, diarrhea, nausea and vomiting.   Genitourinary:  Negative for dysuria, flank pain, frequency and hematuria.   Musculoskeletal:  Negative for joint swelling, myalgias, neck pain and neck stiffness.   Skin:  Negative for rash and wound.   Neurological:  Negative for dizziness, seizures, syncope, facial asymmetry and headaches.   Psychiatric/Behavioral:  Negative for hallucinations, self-injury and suicidal ideas.    All other systems reviewed and are negative.      Physical Exam  Physical Exam  Vitals and nursing note reviewed.   Constitutional:       General: He is not in acute distress.     Appearance: He is well-developed.   HENT:      Head: Normocephalic and atraumatic.      Right Ear: Ear canal and external ear normal.      Left Ear: Ear canal and external ear normal.      Ears:      Comments: Mild injection of the bilateral TMs consistent with viral infection     Mouth/Throat:      Mouth: Mucous membranes are moist.      Pharynx: Oropharynx is clear. No oropharyngeal exudate or posterior oropharyngeal erythema.   Eyes:      General: No scleral icterus.        Right eye: No discharge.         Left eye: No discharge.      Extraocular Movements: Extraocular movements intact.      Conjunctiva/sclera: Conjunctivae normal.   Cardiovascular:      Rate and Rhythm: Normal rate and regular rhythm.      Heart sounds: Normal heart sounds. No murmur heard.  Pulmonary:      Effort: Pulmonary effort is  normal.      Breath sounds: Normal breath sounds. No wheezing or rales.   Abdominal:      General: Bowel sounds are normal. There is no distension.      Palpations: Abdomen is soft.      Tenderness: There is no abdominal tenderness. There is no guarding or rebound.   Musculoskeletal:         General: No deformity. Normal range of motion.      Cervical back: Normal range of motion and neck supple.   Skin:     General: Skin is warm and dry.      Findings: No rash.   Neurological:      General: No focal deficit present.      Mental Status: He is alert and oriented to person, place, and time.      Cranial Nerves: No cranial nerve deficit.   Psychiatric:         Mood and Affect: Mood normal.         Behavior: Behavior normal.         Thought Content: Thought content normal.         Judgment: Judgment normal.         Vital Signs  ED Triage Vitals [12/20/23 1144]   Temperature Pulse Respirations Blood Pressure SpO2   98 °F (36.7 °C) 90 18 (!) 180/98 98 %      Temp Source Heart Rate Source Patient Position - Orthostatic VS BP Location FiO2 (%)   Tympanic Monitor Sitting Right arm --      Pain Score       4           Vitals:    12/20/23 1144   BP: (!) 180/98   Pulse: 90   Patient Position - Orthostatic VS: Sitting         Visual Acuity      ED Medications  Medications - No data to display    Diagnostic Studies  Results Reviewed       Procedure Component Value Units Date/Time    FLU/RSV/COVID - if FLU/RSV clinically relevant [[id number]] Collected: 12/20/23 1145    Lab Status: In process Specimen: Nares from Nose Updated: 12/20/23 1148                   No orders to display              Procedures  Procedures         ED Course                               SBIRT 20yo+      Flowsheet Row Most Recent Value   Initial Alcohol Screen: US AUDIT-C     1. How often do you have a drink containing alcohol? 0 Filed at: 12/20/2023 1144   2. How many drinks containing alcohol do you have on a typical day you are drinking?  0 Filed at:  12/20/2023 1144   3a. Male UNDER 65: How often do you have five or more drinks on one occasion? 0 Filed at: 12/20/2023 1144   3b. FEMALE Any Age, or MALE 65+: How often do you have 4 or more drinks on one occassion? 0 Filed at: 12/20/2023 1144   Audit-C Score 0 Filed at: 12/20/2023 1144   ROCKY: How many times in the past year have you...    Used an illegal drug or used a prescription medication for non-medical reasons? Never Filed at: 12/20/2023 1144                      Medical Decision Making  Based on the history and medical screening exam performed the diagnostic considerations include but are not limited to   Viral URI, RSV, COVID, influenza, other viral respiratory illness    Based on the work-up performed in the emergency room which includes physical examination, and which may include laboratory studies and imaging as warranted including advanced imaging such as CT scan or ultrasound, the differential diagnosis is narrowed to exclude limb or life-threatening process.    The patient is stable for discharge.    Discussed with the patient that as his daughter recently tested positive for RSV, his symptoms are likely due to RSV as well.  Discussed that RSV is a viral illness and that antibiotics are not indicated.  Patient has a benign examination with mild ear injection consistent with viral illness.  Agreeable to forego antibiotics at this time.  Patient chose to be discharged prior to results of COVID testing.    Amount and/or Complexity of Data Reviewed  Labs: ordered. Decision-making details documented in ED Course.     Details: COVID/flu/RSV testing pending at time of discharge             Disposition  Final diagnoses:   Viral URI     Time reflects when diagnosis was documented in both MDM as applicable and the Disposition within this note       Time User Action Codes Description Comment    12/20/2023 12:12 PM Isidro Clement Add [J06.9] Viral URI           ED Disposition       ED Disposition   Discharge     Condition   Stable    Date/Time   Wed Dec 20, 2023 1212    Comment   Parth Allen discharge to home/self care.                   Follow-up Information       Follow up With Specialties Details Why Contact Info    Your primary care physician   As needed             There are no discharge medications for this patient.      No discharge procedures on file.    PDMP Review       None            ED Provider  Electronically Signed by             Isidro Clement MD  12/20/23 9498

## 2024-04-15 ENCOUNTER — HOSPITAL ENCOUNTER (EMERGENCY)
Facility: HOSPITAL | Age: 24
Discharge: LEFT BEFORE DISCHARGE | End: 2024-04-15
Attending: EMERGENCY MEDICINE | Admitting: EMERGENCY MEDICINE

## 2024-04-15 VITALS
TEMPERATURE: 99.1 F | DIASTOLIC BLOOD PRESSURE: 97 MMHG | WEIGHT: 250 LBS | RESPIRATION RATE: 18 BRPM | BODY MASS INDEX: 33.86 KG/M2 | HEIGHT: 72 IN | SYSTOLIC BLOOD PRESSURE: 147 MMHG | HEART RATE: 118 BPM | OXYGEN SATURATION: 97 %

## 2024-04-15 DIAGNOSIS — R10.9 ABDOMINAL PAIN: Primary | ICD-10-CM

## 2024-04-15 PROCEDURE — 99284 EMERGENCY DEPT VISIT MOD MDM: CPT | Performed by: EMERGENCY MEDICINE

## 2024-04-15 PROCEDURE — 99283 EMERGENCY DEPT VISIT LOW MDM: CPT

## 2024-04-15 RX ORDER — KETOROLAC TROMETHAMINE 30 MG/ML
30 INJECTION, SOLUTION INTRAMUSCULAR; INTRAVENOUS ONCE
Status: DISCONTINUED | OUTPATIENT
Start: 2024-04-15 | End: 2024-04-16 | Stop reason: HOSPADM

## 2024-04-15 RX ORDER — ONDANSETRON 2 MG/ML
4 INJECTION INTRAMUSCULAR; INTRAVENOUS ONCE
Status: DISCONTINUED | OUTPATIENT
Start: 2024-04-15 | End: 2024-04-16 | Stop reason: HOSPADM

## 2024-04-16 NOTE — ED NOTES
Went into patient room to obtain bloodwork and give medications, pt expressed concerns regarding insurance and wished to be discharged at this time. Dr. Clement made aware     Marion Sullivan, RN  04/15/24 [id number]

## 2024-04-16 NOTE — ED PROVIDER NOTES
History  Chief Complaint   Patient presents with    Abdominal Pain     Pt reports starting a week ago he's had N/V/D and RUQ abdominal pain. OTC medications providing now relief. Highest temperature was 99.5. Thinking he got food poisoning from [employer] last week because his wife has been sick as well.     1 week right upper quadrant abdominal pain, usually worse 30 to 40 minutes after eating, some associated nausea and diarrhea.  No fevers.  No vomiting.      History provided by:  Patient   used: No    Abdominal Pain  Pain location:  RUQ  Pain quality: aching    Pain radiates to:  Does not radiate  Pain severity:  Moderate  Onset quality:  Gradual  Duration:  7 days  Timing:  Intermittent  Progression:  Unchanged  Chronicity:  New  Relieved by:  Nothing  Worsened by:  Eating  Ineffective treatments:  None tried  Associated symptoms: diarrhea and nausea    Associated symptoms: no chest pain, no chills, no constipation, no cough, no dysuria, no fever, no flatus, no hematemesis, no hematochezia, no hematuria, no shortness of breath, no sore throat and no vomiting        None       History reviewed. No pertinent past medical history.    History reviewed. No pertinent surgical history.    History reviewed. No pertinent family history.  I have reviewed and agree with the history as documented.    E-Cigarette/Vaping    E-Cigarette Use Current Every Day User      E-Cigarette/Vaping Substances    Nicotine Yes     Flavoring Yes      Social History     Tobacco Use    Smoking status: Never    Smokeless tobacco: Never   Vaping Use    Vaping status: Every Day    Substances: Nicotine, Flavoring   Substance Use Topics    Alcohol use: Yes     Comment: social    Drug use: Never       Review of Systems   Constitutional:  Negative for chills and fever.   HENT:  Negative for ear pain, hearing loss, sore throat, trouble swallowing and voice change.    Eyes:  Negative for pain and discharge.   Respiratory:  Negative  for cough, shortness of breath and wheezing.    Cardiovascular:  Negative for chest pain and palpitations.   Gastrointestinal:  Positive for abdominal pain, diarrhea and nausea. Negative for blood in stool, constipation, flatus, hematemesis, hematochezia and vomiting.   Genitourinary:  Negative for dysuria, flank pain, frequency and hematuria.   Musculoskeletal:  Negative for joint swelling, neck pain and neck stiffness.   Skin:  Negative for rash and wound.   Neurological:  Negative for dizziness, seizures, syncope, facial asymmetry and headaches.   Psychiatric/Behavioral:  Negative for hallucinations, self-injury and suicidal ideas.    All other systems reviewed and are negative.      Physical Exam  Physical Exam  Vitals and nursing note reviewed.   Constitutional:       General: He is not in acute distress.     Appearance: He is well-developed.   HENT:      Head: Normocephalic and atraumatic.      Right Ear: External ear normal.      Left Ear: External ear normal.   Eyes:      General: No scleral icterus.        Right eye: No discharge.         Left eye: No discharge.      Extraocular Movements: Extraocular movements intact.      Conjunctiva/sclera: Conjunctivae normal.   Cardiovascular:      Rate and Rhythm: Normal rate and regular rhythm.      Heart sounds: Normal heart sounds. No murmur heard.  Pulmonary:      Effort: Pulmonary effort is normal.      Breath sounds: Normal breath sounds. No wheezing or rales.   Abdominal:      General: Bowel sounds are normal. There is no distension.      Palpations: Abdomen is soft.      Tenderness: There is abdominal tenderness in the right upper quadrant. There is no guarding or rebound. Negative signs include Ivory's sign and McBurney's sign.   Musculoskeletal:         General: No deformity. Normal range of motion.      Cervical back: Normal range of motion and neck supple.   Skin:     General: Skin is warm and dry.      Findings: No rash.   Neurological:      General: No  focal deficit present.      Mental Status: He is alert and oriented to person, place, and time.      Cranial Nerves: No cranial nerve deficit.   Psychiatric:         Mood and Affect: Mood normal.         Behavior: Behavior normal.         Thought Content: Thought content normal.         Judgment: Judgment normal.         Vital Signs  ED Triage Vitals [04/15/24 2138]   Temperature Pulse Respirations Blood Pressure SpO2   99.1 °F (37.3 °C) (!) 118 18 147/97 97 %      Temp Source Heart Rate Source Patient Position - Orthostatic VS BP Location FiO2 (%)   Temporal Monitor Lying Left arm --      Pain Score       5           Vitals:    04/15/24 2138   BP: 147/97   Pulse: (!) 118   Patient Position - Orthostatic VS: Lying         Visual Acuity      ED Medications  Medications - No data to display      Diagnostic Studies  Results Reviewed       None                   No orders to display              Procedures  Procedures         ED Course                               SBIRT 22yo+      Flowsheet Row Most Recent Value   Initial Alcohol Screen: US AUDIT-C     1. How often do you have a drink containing alcohol? 0 Filed at: 04/15/2024 2139   2. How many drinks containing alcohol do you have on a typical day you are drinking?  0 Filed at: 04/15/2024 2139   3a. Male UNDER 65: How often do you have five or more drinks on one occasion? 0 Filed at: 04/15/2024 2139   Audit-C Score 0 Filed at: 04/15/2024 2139   ROCKY: How many times in the past year have you...    Used an illegal drug or used a prescription medication for non-medical reasons? Never Filed at: 04/15/2024 2139                      Medical Decision Making  Based on the history and medical screening exam performed the diagnostic considerations include but are not limited to cholecystitis, cholelithiasis, pancreatitis, GERD, acid reflux, dyspepsia, gastritis, colitis, diverticulitis, viral gastroenteritis.    Based on the work-up performed in the emergency room which  includes physical examination, and which may include laboratory studies and imaging as warranted including advanced imaging such as CT scan or ultrasound, the diagnostic considerations are narrowed to exclude limb or life-threatening process.    The patient is stable for discharge.  Patient choosing to leave AGAINST MEDICAL ADVICE at this time.  He understands the risks of doing so.  He is alert and oriented x 4.  I did explain to him that without lab work and/or imaging I cannot adequately assess his condition.  He understood this but chose to leave in any case.  He has signed an AMA form.  I have encouraged him to return to the emergency department at any time for reevaluation should he change his mind or should his symptoms worsen.    Amount and/or Complexity of Data Reviewed  Labs: ordered.     Details: Patient declined lab work  Radiology: ordered.     Details: Patient declined imaging    Risk  Prescription drug management.             Disposition  Final diagnoses:   Abdominal pain     Time reflects when diagnosis was documented in both MDM as applicable and the Disposition within this note       Time User Action Codes Description Comment    4/15/2024 10:36 PM Isidro Clement Add [R10.9] Abdominal pain     4/15/2024 10:39 PM Isidro Clement Add [R10.31] Right lower quadrant abdominal pain     4/26/2024  2:49 PM Isidro Clement Remove [R10.31] Right lower quadrant abdominal pain           ED Disposition       ED Disposition   AMA    Condition   --    Date/Time   Mon Apr 15, 2024 2001    Comment   Date: 4/15/2024  Patient: Parth Allen  Admitted: 4/15/2024  9:34 PM  Attending Provider: Isidro Clement MD    Parth Allen or his authorized caregiver has made the decision for the patient to leave the emergency department against the advic e of his attending physician. He or his authorized caregiver has been informed and understands the inherent risks, including death, cholelithiasis, cholecystitis,  choledocholithiasis, sepsis, permanent disability.  He or his authorized caregiver has  decided to accept the responsibility for this decision. Parth Allen and all necessary parties have been advised that he may return for further evaluation or treatment. His condition at time of discharge was stable.  Parth Allen had current vi sajan signs as follows:  /97 (BP Location: Left arm)   Pulse (!) 118   Temp 99.1 °F (37.3 °C) (Temporal)   Resp 18   Ht 6' (1.829 m)   Wt 113 kg (250 lb)                Follow-up Information       Follow up With Specialties Details Why Contact Info    Your primary care physician   As needed             There are no discharge medications for this patient.      No discharge procedures on file.    PDMP Review       None            ED Provider  Electronically Signed by             Isidro Clement MD  04/15/24 [id number]       Isidro Clement MD  04/26/24 5200

## 2024-11-22 ENCOUNTER — OFFICE VISIT (OUTPATIENT)
Dept: URGENT CARE | Facility: CLINIC | Age: 24
End: 2024-11-22
Payer: COMMERCIAL

## 2024-11-22 ENCOUNTER — APPOINTMENT (EMERGENCY)
Dept: CT IMAGING | Facility: HOSPITAL | Age: 24
DRG: 234 | End: 2024-11-22
Payer: COMMERCIAL

## 2024-11-22 ENCOUNTER — ANESTHESIA EVENT (INPATIENT)
Dept: PERIOP | Facility: HOSPITAL | Age: 24
DRG: 234 | End: 2024-11-22
Payer: COMMERCIAL

## 2024-11-22 ENCOUNTER — ANESTHESIA (INPATIENT)
Dept: PERIOP | Facility: HOSPITAL | Age: 24
DRG: 234 | End: 2024-11-22
Payer: COMMERCIAL

## 2024-11-22 ENCOUNTER — HOSPITAL ENCOUNTER (INPATIENT)
Facility: HOSPITAL | Age: 24
LOS: 1 days | Discharge: HOME | DRG: 234 | End: 2024-11-23
Attending: STUDENT IN AN ORGANIZED HEALTH CARE EDUCATION/TRAINING PROGRAM | Admitting: STUDENT IN AN ORGANIZED HEALTH CARE EDUCATION/TRAINING PROGRAM
Payer: COMMERCIAL

## 2024-11-22 VITALS
RESPIRATION RATE: 18 BRPM | TEMPERATURE: 99.4 F | HEART RATE: 114 BPM | SYSTOLIC BLOOD PRESSURE: 139 MMHG | DIASTOLIC BLOOD PRESSURE: 84 MMHG | OXYGEN SATURATION: 97 %

## 2024-11-22 DIAGNOSIS — R10.31 RIGHT LOWER QUADRANT ABDOMINAL PAIN: Primary | ICD-10-CM

## 2024-11-22 DIAGNOSIS — K35.80 ACUTE APPENDICITIS: Primary | ICD-10-CM

## 2024-11-22 PROBLEM — K35.30 ACUTE APPENDICITIS WITH LOCALIZED PERITONITIS, WITHOUT PERFORATION, ABSCESS, OR GANGRENE: Status: ACTIVE | Noted: 2024-11-22

## 2024-11-22 LAB
ALBUMIN SERPL BCG-MCNC: 4.7 G/DL (ref 3.5–5)
ALP SERPL-CCNC: 62 U/L (ref 34–104)
ALT SERPL W P-5'-P-CCNC: 26 U/L (ref 7–52)
ANION GAP SERPL CALCULATED.3IONS-SCNC: 9 MMOL/L (ref 4–13)
AST SERPL W P-5'-P-CCNC: 15 U/L (ref 13–39)
BASOPHILS # BLD AUTO: 0.03 THOUSANDS/ÂΜL (ref 0–0.1)
BASOPHILS NFR BLD AUTO: 0 % (ref 0–1)
BILIRUB SERPL-MCNC: 1.18 MG/DL (ref 0.2–1)
BILIRUB UR QL STRIP: NEGATIVE
BUN SERPL-MCNC: 11 MG/DL (ref 5–25)
CALCIUM SERPL-MCNC: 9.4 MG/DL (ref 8.4–10.2)
CHLORIDE SERPL-SCNC: 103 MMOL/L (ref 96–108)
CLARITY UR: CLEAR
CO2 SERPL-SCNC: 25 MMOL/L (ref 21–32)
COLOR UR: YELLOW
CREAT SERPL-MCNC: 0.85 MG/DL (ref 0.6–1.3)
EOSINOPHIL # BLD AUTO: 0.27 THOUSAND/ÂΜL (ref 0–0.61)
EOSINOPHIL NFR BLD AUTO: 2 % (ref 0–6)
ERYTHROCYTE [DISTWIDTH] IN BLOOD BY AUTOMATED COUNT: 13.3 % (ref 11.6–15.1)
GFR SERPL CREATININE-BSD FRML MDRD: 123 ML/MIN/1.73SQ M
GLUCOSE SERPL-MCNC: 92 MG/DL (ref 65–140)
GLUCOSE UR STRIP-MCNC: NEGATIVE MG/DL
HCT VFR BLD AUTO: 49.4 % (ref 36.5–49.3)
HGB BLD-MCNC: 16.2 G/DL (ref 12–17)
HGB UR QL STRIP.AUTO: NEGATIVE
IMM GRANULOCYTES # BLD AUTO: 0.07 THOUSAND/UL (ref 0–0.2)
IMM GRANULOCYTES NFR BLD AUTO: 1 % (ref 0–2)
KETONES UR STRIP-MCNC: NEGATIVE MG/DL
LACTATE SERPL-SCNC: 0.8 MMOL/L (ref 0.5–2)
LEUKOCYTE ESTERASE UR QL STRIP: NEGATIVE
LIPASE SERPL-CCNC: 24 U/L (ref 11–82)
LYMPHOCYTES # BLD AUTO: 1.99 THOUSANDS/ÂΜL (ref 0.6–4.47)
LYMPHOCYTES NFR BLD AUTO: 13 % (ref 14–44)
MCH RBC QN AUTO: 27.3 PG (ref 26.8–34.3)
MCHC RBC AUTO-ENTMCNC: 32.8 G/DL (ref 31.4–37.4)
MCV RBC AUTO: 83 FL (ref 82–98)
MONOCYTES # BLD AUTO: 1.22 THOUSAND/ÂΜL (ref 0.17–1.22)
MONOCYTES NFR BLD AUTO: 8 % (ref 4–12)
NEUTROPHILS # BLD AUTO: 11.81 THOUSANDS/ÂΜL (ref 1.85–7.62)
NEUTS SEG NFR BLD AUTO: 76 % (ref 43–75)
NITRITE UR QL STRIP: NEGATIVE
NRBC BLD AUTO-RTO: 0 /100 WBCS
PH UR STRIP.AUTO: 7 [PH]
PLATELET # BLD AUTO: 253 THOUSANDS/UL (ref 149–390)
PMV BLD AUTO: 10.3 FL (ref 8.9–12.7)
POTASSIUM SERPL-SCNC: 3.7 MMOL/L (ref 3.5–5.3)
PROT SERPL-MCNC: 7.6 G/DL (ref 6.4–8.4)
PROT UR STRIP-MCNC: NEGATIVE MG/DL
RBC # BLD AUTO: 5.93 MILLION/UL (ref 3.88–5.62)
SODIUM SERPL-SCNC: 137 MMOL/L (ref 135–147)
SP GR UR STRIP.AUTO: <=1.005 (ref 1–1.03)
UROBILINOGEN UR QL STRIP.AUTO: 0.2 E.U./DL
WBC # BLD AUTO: 15.39 THOUSAND/UL (ref 4.31–10.16)

## 2024-11-22 PROCEDURE — 99285 EMERGENCY DEPT VISIT HI MDM: CPT | Performed by: EMERGENCY MEDICINE

## 2024-11-22 PROCEDURE — 0DTJ4ZZ RESECTION OF APPENDIX, PERCUTANEOUS ENDOSCOPIC APPROACH: ICD-10-PCS | Performed by: STUDENT IN AN ORGANIZED HEALTH CARE EDUCATION/TRAINING PROGRAM

## 2024-11-22 PROCEDURE — 83690 ASSAY OF LIPASE: CPT | Performed by: PHYSICIAN ASSISTANT

## 2024-11-22 PROCEDURE — 88304 TISSUE EXAM BY PATHOLOGIST: CPT | Performed by: STUDENT IN AN ORGANIZED HEALTH CARE EDUCATION/TRAINING PROGRAM

## 2024-11-22 PROCEDURE — 85025 COMPLETE CBC W/AUTO DIFF WBC: CPT | Performed by: PHYSICIAN ASSISTANT

## 2024-11-22 PROCEDURE — 36415 COLL VENOUS BLD VENIPUNCTURE: CPT | Performed by: PHYSICIAN ASSISTANT

## 2024-11-22 PROCEDURE — 83605 ASSAY OF LACTIC ACID: CPT | Performed by: PHYSICIAN ASSISTANT

## 2024-11-22 PROCEDURE — 96361 HYDRATE IV INFUSION ADD-ON: CPT

## 2024-11-22 PROCEDURE — S9088 SERVICES PROVIDED IN URGENT: HCPCS | Performed by: PHYSICIAN ASSISTANT

## 2024-11-22 PROCEDURE — 99213 OFFICE O/P EST LOW 20 MIN: CPT | Performed by: PHYSICIAN ASSISTANT

## 2024-11-22 PROCEDURE — 81003 URINALYSIS AUTO W/O SCOPE: CPT | Performed by: PHYSICIAN ASSISTANT

## 2024-11-22 PROCEDURE — 74177 CT ABD & PELVIS W/CONTRAST: CPT

## 2024-11-22 PROCEDURE — 80053 COMPREHEN METABOLIC PANEL: CPT | Performed by: PHYSICIAN ASSISTANT

## 2024-11-22 PROCEDURE — 96374 THER/PROPH/DIAG INJ IV PUSH: CPT

## 2024-11-22 PROCEDURE — 99284 EMERGENCY DEPT VISIT MOD MDM: CPT

## 2024-11-22 RX ORDER — PROMETHAZINE HYDROCHLORIDE 25 MG/ML
12.5 INJECTION, SOLUTION INTRAMUSCULAR; INTRAVENOUS ONCE AS NEEDED
Status: DISCONTINUED | OUTPATIENT
Start: 2024-11-22 | End: 2024-11-22 | Stop reason: HOSPADM

## 2024-11-22 RX ORDER — PHENYLEPHRINE HCL IN 0.9% NACL 1 MG/10 ML
SYRINGE (ML) INTRAVENOUS AS NEEDED
Status: DISCONTINUED | OUTPATIENT
Start: 2024-11-22 | End: 2024-11-22

## 2024-11-22 RX ORDER — ONDANSETRON 2 MG/ML
INJECTION INTRAMUSCULAR; INTRAVENOUS AS NEEDED
Status: DISCONTINUED | OUTPATIENT
Start: 2024-11-22 | End: 2024-11-22

## 2024-11-22 RX ORDER — MEPERIDINE HYDROCHLORIDE 25 MG/ML
12.5 INJECTION INTRAMUSCULAR; INTRAVENOUS; SUBCUTANEOUS
Status: DISCONTINUED | OUTPATIENT
Start: 2024-11-22 | End: 2024-11-22 | Stop reason: HOSPADM

## 2024-11-22 RX ORDER — SODIUM CHLORIDE 9 MG/ML
100 INJECTION, SOLUTION INTRAVENOUS CONTINUOUS
Status: DISCONTINUED | OUTPATIENT
Start: 2024-11-22 | End: 2024-11-23

## 2024-11-22 RX ORDER — ACETAMINOPHEN 325 MG/1
650 TABLET ORAL EVERY 6 HOURS PRN
Status: DISCONTINUED | OUTPATIENT
Start: 2024-11-22 | End: 2024-11-23 | Stop reason: HOSPADM

## 2024-11-22 RX ORDER — KETOROLAC TROMETHAMINE 30 MG/ML
15 INJECTION, SOLUTION INTRAMUSCULAR; INTRAVENOUS ONCE
Status: COMPLETED | OUTPATIENT
Start: 2024-11-22 | End: 2024-11-22

## 2024-11-22 RX ORDER — LIDOCAINE HYDROCHLORIDE 10 MG/ML
INJECTION, SOLUTION EPIDURAL; INFILTRATION; INTRACAUDAL; PERINEURAL AS NEEDED
Status: DISCONTINUED | OUTPATIENT
Start: 2024-11-22 | End: 2024-11-22

## 2024-11-22 RX ORDER — DEXAMETHASONE SODIUM PHOSPHATE 10 MG/ML
INJECTION, SOLUTION INTRAMUSCULAR; INTRAVENOUS AS NEEDED
Status: DISCONTINUED | OUTPATIENT
Start: 2024-11-22 | End: 2024-11-22

## 2024-11-22 RX ORDER — PROPOFOL 10 MG/ML
INJECTION, EMULSION INTRAVENOUS CONTINUOUS PRN
Status: DISCONTINUED | OUTPATIENT
Start: 2024-11-22 | End: 2024-11-22

## 2024-11-22 RX ORDER — ONDANSETRON 2 MG/ML
4 INJECTION INTRAMUSCULAR; INTRAVENOUS ONCE AS NEEDED
Status: DISCONTINUED | OUTPATIENT
Start: 2024-11-22 | End: 2024-11-22 | Stop reason: HOSPADM

## 2024-11-22 RX ORDER — MIDAZOLAM HYDROCHLORIDE 2 MG/2ML
INJECTION, SOLUTION INTRAMUSCULAR; INTRAVENOUS AS NEEDED
Status: DISCONTINUED | OUTPATIENT
Start: 2024-11-22 | End: 2024-11-22

## 2024-11-22 RX ORDER — FENTANYL CITRATE 50 UG/ML
INJECTION, SOLUTION INTRAMUSCULAR; INTRAVENOUS AS NEEDED
Status: DISCONTINUED | OUTPATIENT
Start: 2024-11-22 | End: 2024-11-22

## 2024-11-22 RX ORDER — FENTANYL CITRATE/PF 50 MCG/ML
50 SYRINGE (ML) INJECTION
Status: DISCONTINUED | OUTPATIENT
Start: 2024-11-22 | End: 2024-11-22 | Stop reason: HOSPADM

## 2024-11-22 RX ORDER — MAGNESIUM HYDROXIDE 1200 MG/15ML
LIQUID ORAL AS NEEDED
Status: DISCONTINUED | OUTPATIENT
Start: 2024-11-22 | End: 2024-11-22 | Stop reason: HOSPADM

## 2024-11-22 RX ORDER — HYDROMORPHONE HCL/PF 1 MG/ML
0.4 SYRINGE (ML) INJECTION
Status: DISCONTINUED | OUTPATIENT
Start: 2024-11-22 | End: 2024-11-22 | Stop reason: HOSPADM

## 2024-11-22 RX ORDER — ROCURONIUM BROMIDE 10 MG/ML
INJECTION, SOLUTION INTRAVENOUS AS NEEDED
Status: DISCONTINUED | OUTPATIENT
Start: 2024-11-22 | End: 2024-11-22

## 2024-11-22 RX ORDER — PROPOFOL 10 MG/ML
INJECTION, EMULSION INTRAVENOUS AS NEEDED
Status: DISCONTINUED | OUTPATIENT
Start: 2024-11-22 | End: 2024-11-22

## 2024-11-22 RX ORDER — BUPIVACAINE HYDROCHLORIDE AND EPINEPHRINE 2.5; 5 MG/ML; UG/ML
INJECTION, SOLUTION INFILTRATION; PERINEURAL AS NEEDED
Status: DISCONTINUED | OUTPATIENT
Start: 2024-11-22 | End: 2024-11-22 | Stop reason: HOSPADM

## 2024-11-22 RX ORDER — IBUPROFEN 600 MG/1
600 TABLET, FILM COATED ORAL EVERY 6 HOURS PRN
Status: DISCONTINUED | OUTPATIENT
Start: 2024-11-22 | End: 2024-11-23 | Stop reason: HOSPADM

## 2024-11-22 RX ORDER — METRONIDAZOLE 500 MG/100ML
500 INJECTION, SOLUTION INTRAVENOUS
Status: COMPLETED | OUTPATIENT
Start: 2024-11-22 | End: 2024-11-22

## 2024-11-22 RX ORDER — SODIUM CHLORIDE, SODIUM LACTATE, POTASSIUM CHLORIDE, CALCIUM CHLORIDE 600; 310; 30; 20 MG/100ML; MG/100ML; MG/100ML; MG/100ML
INJECTION, SOLUTION INTRAVENOUS CONTINUOUS PRN
Status: DISCONTINUED | OUTPATIENT
Start: 2024-11-22 | End: 2024-11-22

## 2024-11-22 RX ORDER — ACETAMINOPHEN 10 MG/ML
1000 INJECTION, SOLUTION INTRAVENOUS ONCE
Status: COMPLETED | OUTPATIENT
Start: 2024-11-22 | End: 2024-11-22

## 2024-11-22 RX ADMIN — DEXAMETHASONE SODIUM PHOSPHATE 10 MG: 10 INJECTION, SOLUTION INTRAMUSCULAR; INTRAVENOUS at 17:20

## 2024-11-22 RX ADMIN — LIDOCAINE HYDROCHLORIDE 50 MG: 10 INJECTION, SOLUTION EPIDURAL; INFILTRATION; INTRACAUDAL; PERINEURAL at 17:07

## 2024-11-22 RX ADMIN — SODIUM CHLORIDE, SODIUM LACTATE, POTASSIUM CHLORIDE, AND CALCIUM CHLORIDE: .6; .31; .03; .02 INJECTION, SOLUTION INTRAVENOUS at 17:05

## 2024-11-22 RX ADMIN — ROCURONIUM BROMIDE 10 MG: 10 INJECTION, SOLUTION INTRAVENOUS at 17:45

## 2024-11-22 RX ADMIN — SUGAMMADEX 240 MG: 100 INJECTION, SOLUTION INTRAVENOUS at 18:27

## 2024-11-22 RX ADMIN — SODIUM CHLORIDE 100 ML/HR: 0.9 INJECTION, SOLUTION INTRAVENOUS at 19:06

## 2024-11-22 RX ADMIN — MIDAZOLAM HYDROCHLORIDE 2 MG: 1 INJECTION, SOLUTION INTRAMUSCULAR; INTRAVENOUS at 17:05

## 2024-11-22 RX ADMIN — METRONIDAZOLE: 500 INJECTION, SOLUTION INTRAVENOUS at 17:20

## 2024-11-22 RX ADMIN — ACETAMINOPHEN 1000 MG: 1000 INJECTION, SOLUTION INTRAVENOUS at 17:29

## 2024-11-22 RX ADMIN — FENTANYL CITRATE 50 MCG: 50 INJECTION, SOLUTION INTRAMUSCULAR; INTRAVENOUS at 17:16

## 2024-11-22 RX ADMIN — PROPOFOL 250 MG: 10 INJECTION, EMULSION INTRAVENOUS at 17:07

## 2024-11-22 RX ADMIN — SODIUM CHLORIDE 1000 ML: 0.9 INJECTION, SOLUTION INTRAVENOUS at 14:27

## 2024-11-22 RX ADMIN — PROPOFOL 80 MCG/KG/MIN: 10 INJECTION, EMULSION INTRAVENOUS at 17:11

## 2024-11-22 RX ADMIN — KETOROLAC TROMETHAMINE 15 MG: 30 INJECTION, SOLUTION INTRAMUSCULAR at 14:28

## 2024-11-22 RX ADMIN — SODIUM CHLORIDE 8 MCG: 9 INJECTION, SOLUTION INTRAVENOUS at 17:25

## 2024-11-22 RX ADMIN — CEFAZOLIN 3000 MG: 1 INJECTION, POWDER, FOR SOLUTION INTRAMUSCULAR; INTRAVENOUS at 17:05

## 2024-11-22 RX ADMIN — FENTANYL CITRATE 50 MCG: 50 INJECTION, SOLUTION INTRAMUSCULAR; INTRAVENOUS at 17:07

## 2024-11-22 RX ADMIN — SODIUM CHLORIDE 100 ML/HR: 0.9 INJECTION, SOLUTION INTRAVENOUS at 20:23

## 2024-11-22 RX ADMIN — ONDANSETRON 4 MG: 2 INJECTION INTRAMUSCULAR; INTRAVENOUS at 18:27

## 2024-11-22 RX ADMIN — SODIUM CHLORIDE, SODIUM LACTATE, POTASSIUM CHLORIDE, AND CALCIUM CHLORIDE: .6; .31; .03; .02 INJECTION, SOLUTION INTRAVENOUS at 18:17

## 2024-11-22 RX ADMIN — Medication 100 MCG: at 17:56

## 2024-11-22 RX ADMIN — SODIUM CHLORIDE 8 MCG: 9 INJECTION, SOLUTION INTRAVENOUS at 17:12

## 2024-11-22 RX ADMIN — IOHEXOL 100 ML: 350 INJECTION, SOLUTION INTRAVENOUS at 14:51

## 2024-11-22 RX ADMIN — ROCURONIUM BROMIDE 50 MG: 10 INJECTION, SOLUTION INTRAVENOUS at 17:07

## 2024-11-22 NOTE — ED ATTENDING ATTESTATION
11/22/2024  IiLnda DO, saw and evaluated the patient. I have discussed the patient with the resident/non-physician practitioner and agree with the resident's/non-physician practitioner's findings, Plan of Care, and MDM as documented in the resident's/non-physician practitioner's note, except where noted. All available labs and Radiology studies were reviewed.  I was present for key portions of any procedure(s) performed by the resident/non-physician practitioner and I was immediately available to provide assistance.       At this point I agree with the current assessment done in the Emergency Department.  I have conducted an independent evaluation of this patient a history and physical is as follows:    ED Course     This is a 23-year-old male presenting to the ED for evaluation of abdominal pain.  Workup reveals acute appendicitis.  Admitted to the surgical service.    Critical Care Time  Procedures

## 2024-11-22 NOTE — H&P
H&P - Surgery-General   Name: Parth Allen 23 y.o. male I MRN: [id number]  Unit/Bed#: ED 06 I Date of Admission: 11/22/2024   Date of Service: 11/22/2024 I Hospital Day: 0     Assessment & Plan  Acute appendicitis  We will plan to take to the operating room today for laparoscopic appendectomy  N.p.o. for procedure with IV fluids  Ancef and Flagyl on-call  Will advance diet postoperatively  Will keep patient for observation tonight    History of Present Illness   Parth Allen is a 23 y.o. male who presents with abdominal pain.  The patient states about 10 PM last night he developed vague abdominal pain.  Today his pain worsened and settled more in the lower abdomen.  His pain is worsened with movement.  He denies any nausea or vomiting.  He did attempt to eat some soup at 1:00 this afternoon however had no appetite.  He denies any fever or chills..    Review of Systems   Constitutional:  Positive for appetite change. Negative for fever.   HENT: Negative.     Respiratory: Negative.     Cardiovascular: Negative.    Gastrointestinal:  Positive for abdominal pain. Negative for nausea and vomiting.   Genitourinary: Negative.    Skin: Negative.    Hematological: Negative.      I have reviewed the patient's PMH, PSH, Social History, Family History, Meds, and Allergies  Historical Information   History reviewed. No pertinent past medical history.  History reviewed. No pertinent surgical history.  Social History     Tobacco Use    Smoking status: Never    Smokeless tobacco: Never   Vaping Use    Vaping status: Every Day    Substances: Nicotine, Flavoring   Substance and Sexual Activity    Alcohol use: Yes     Comment: social    Drug use: Never    Sexual activity: Yes     E-Cigarette/Vaping    E-Cigarette Use Current Every Day User      E-Cigarette/Vaping Substances    Nicotine Yes     Flavoring Yes      Family history non-contributory    Objective :  Temp:  [98.5 °F (36.9 °C)-99.4 °F (37.4 °C)] 98.5 °F (36.9 °C)  HR:   [] 90  BP: (122-140)/(56-84) 122/56  Resp:  [18-19] 19  SpO2:  [97 %-99 %] 99 %  O2 Device: None (Room air)      Physical Exam  Constitutional:       Appearance: Normal appearance.   HENT:      Head: Normocephalic and atraumatic.      Mouth/Throat:      Mouth: Mucous membranes are moist.   Cardiovascular:      Rate and Rhythm: Normal rate and regular rhythm.   Abdominal:      General: There is no distension.      Palpations: Abdomen is soft.      Tenderness: There is abdominal tenderness (right lateral abdomen).      Hernia: No hernia is present.   Musculoskeletal:         General: Normal range of motion.   Skin:     General: Skin is warm and dry.   Neurological:      General: No focal deficit present.      Mental Status: He is alert and oriented to person, place, and time.         Lab Results: I have reviewed the following results:  Recent Labs     11/22/24  1425   WBC 15.39*   HGB 16.2   HCT 49.4*      SODIUM 137   K 3.7      CO2 25   BUN 11   CREATININE 0.85   GLUC 92   AST 15   ALT 26   ALB 4.7   TBILI 1.18*   ALKPHOS 62   LACTICACID 0.8       Imaging Results Review: I personally reviewed the following image studies/reports in PACS and discussed pertinent findings with Radiology: CT abdomen/pelvis. My interpretation of the radiology images/reports is: I agree with the reading of acute appendicitis, the appendix is retrocecal and appears uncomplicated.  Other Study Results Review: No additional pertinent studies reviewed.    VTE Pharmacologic Prophylaxis: Reason for no pharmacologic prophylaxis low risk  VTE Mechanical Prophylaxis: sequential compression device

## 2024-11-22 NOTE — ED PROVIDER NOTES
"Time reflects when diagnosis was documented in both MDM as applicable and the Disposition within this note       Time User Action Codes Description Comment    11/22/2024  3:37 PM Gordon Jacobo Add [K35.80] Acute appendicitis           ED Disposition       ED Disposition   Admit    Condition   Stable    Date/Time   Fri Nov 22, 2024  3:55 PM    Comment   Case was discussed with atif  and the patient's admission status was agreed to be Admission Status: inpatient status to the service of Dr. dye .               Assessment & Plan       Medical Decision Making  The patient is a 23-year-old male presents with a complaint of right lower quadrant pain 10 PM last night.  Patient states that it occurred suddenly.  States that he had the urge to use the bathroom.  Patient states he has not had any dysuria hematuria diarrhea or vomiting.  Patient denies any fevers or chills.  Patient states the pain gradually worsened.  Today went to urgent care and was sent here for further evaluation and treatment.  Patient states that the pain now is more in the right lower quadrant/periumbilical area.  Denies any abdominal surgeries in the past.  Did not take any medications today.  States that he attempted to eat earlier today but felt he \"made the pain worse\"    RLQ pain on examination.      Differential diagnosis was included but not limited to: GERD, gastritis, PUD, esophageal spasm, pancreatitis, acute cholecystitis, acute cholangitis, biliary colic, acute cystitis, renal colic, kidney stone, MSK pain, diverticulitis, constipation, proctitis, small bowel obstruction, large bowel obstruction, mass, viral syndrome    Admission to general surgery, Dr. Dye      Amount and/or Complexity of Data Reviewed  Labs: ordered. Decision-making details documented in ED Course.  Radiology: ordered.    Risk  OTC drugs.  Prescription drug management.  Decision regarding hospitalization.        ED Course as of 11/22/24 1624   Fri Nov 22, " 2024   1505 WBC(!): 15.39       Medications   acetaminophen (TYLENOL) tablet 650 mg (has no administration in time range)   ceFAZolin (ANCEF) 3,000 mg in dextrose 5 % 100 mL IVPB (has no administration in time range)   metroNIDAZOLE (FLAGYL) IVPB (premix) 500 mg 100 mL (has no administration in time range)   sodium chloride 0.9 % infusion (has no administration in time range)   sodium chloride 0.9 % bolus 1,000 mL (0 mL Intravenous Stopped 11/22/24 1527)   ketorolac (TORADOL) injection 15 mg (15 mg Intravenous Given 11/22/24 1428)   iohexol (OMNIPAQUE) 350 MG/ML injection (MULTI-DOSE) 100 mL (100 mL Intravenous Given 11/22/24 1451)       ED Risk Strat Scores                           SBIRT 22yo+      Flowsheet Row Most Recent Value   Initial Alcohol Screen:  AUDIT-C     1. How often do you have a drink containing alcohol? 0 Filed at: 11/22/2024 1411   2. How many drinks containing alcohol do you have on a typical day you are drinking?  0 Filed at: 11/22/2024 1411   3a. Male UNDER 65: How often do you have five or more drinks on one occasion? 0 Filed at: 11/22/2024 1411   Audit-C Score 0 Filed at: 11/22/2024 1411   ROCKY: How many times in the past year have you...    Used an illegal drug or used a prescription medication for non-medical reasons? Never Filed at: 11/22/2024 1411                            History of Present Illness       Chief Complaint   Patient presents with    Abdominal Pain     Reports RLQ and R flank pain that began last night, pain has increased since. Reports intermittent throbbing, 7/10 pain depending on position. Denies n/v/d or fever. Denies urinary complaints.       History reviewed. No pertinent past medical history.   History reviewed. No pertinent surgical history.   History reviewed. No pertinent family history.   Social History     Tobacco Use    Smoking status: Never    Smokeless tobacco: Never   Vaping Use    Vaping status: Every Day    Substances: Nicotine, Flavoring   Substance  "Use Topics    Alcohol use: Yes     Comment: social    Drug use: Never      E-Cigarette/Vaping    E-Cigarette Use Current Every Day User       E-Cigarette/Vaping Substances    Nicotine Yes     Flavoring Yes       I have reviewed and agree with the history as documented.     The patient is a 23-year-old male presents with a complaint of right lower quadrant pain 10 PM last night.  Patient states that it occurred suddenly.  States that he had the urge to use the bathroom.  Patient states he has not had any dysuria hematuria diarrhea or vomiting.  Patient denies any fevers or chills.  Patient states the pain gradually worsened.  Today went to urgent care and was sent here for further evaluation and treatment.  Patient states that the pain now is more in the right lower quadrant/periumbilical area.  Denies any abdominal surgeries in the past.  Did not take any medications today.  States that he attempted to eat earlier today but felt he \"made the pain worse\"          Review of Systems   All other systems reviewed and are negative.          Objective       ED Triage Vitals [11/22/24 1411]   Temperature Pulse Blood Pressure Respirations SpO2 Patient Position - Orthostatic VS   98.5 °F (36.9 °C) (!) 114 131/79 19 99 % Sitting      Temp src Heart Rate Source BP Location FiO2 (%) Pain Score    -- Monitor Right arm -- 7      Vitals      Date and Time Temp Pulse SpO2 Resp BP Pain Score FACES Pain Rating User   11/22/24 1600 -- 90 99 % -- 122/56 -- --    11/22/24 1545 -- 93 98 % -- 140/65 -- --    11/22/24 1430 -- 99 97 % -- -- -- --    11/22/24 1428 -- -- -- -- -- 7 --    11/22/24 1415 -- 109 98 % -- 131/79 -- --    11/22/24 1411 98.5 °F (36.9 °C) 114 99 % 19 131/79 7 -- KK            Physical Exam  Vitals and nursing note reviewed.   Constitutional:       General: He is not in acute distress.     Appearance: He is well-developed.   HENT:      Head: Normocephalic and atraumatic.   Eyes:      Pupils: Pupils are equal, " round, and reactive to light.   Cardiovascular:      Rate and Rhythm: Normal rate and regular rhythm.      Heart sounds: No murmur heard.  Pulmonary:      Effort: Pulmonary effort is normal. No respiratory distress.      Breath sounds: Normal breath sounds.   Abdominal:      General: Bowel sounds are normal.      Palpations: Abdomen is soft.      Tenderness: There is abdominal tenderness in the right upper quadrant, right lower quadrant and periumbilical area.   Musculoskeletal:      Cervical back: Normal range of motion.   Skin:     General: Skin is warm and dry.      Capillary Refill: Capillary refill takes less than 2 seconds.   Neurological:      Mental Status: He is alert and oriented to person, place, and time.   Psychiatric:         Behavior: Behavior normal.         Results Reviewed       Procedure Component Value Units Date/Time    UA w Reflex to Microscopic w Reflex to Culture [[id number]] Collected: 11/22/24 1556    Lab Status: Final result Specimen: Urine, Other Updated: 11/22/24 1603     Color, UA Yellow     Clarity, UA Clear     Specific Gravity, UA <=1.005     pH, UA 7.0     Leukocytes, UA Negative     Nitrite, UA Negative     Protein, UA Negative mg/dl      Glucose, UA Negative mg/dl      Ketones, UA Negative mg/dl      Urobilinogen, UA 0.2 E.U./dl      Bilirubin, UA Negative     Occult Blood, UA Negative    Lactic acid, plasma (w/reflex if result > 2.0) [[id number]]  (Normal) Collected: 11/22/24 1425    Lab Status: Final result Specimen: Blood from Arm, Left Updated: 11/22/24 1447     LACTIC ACID 0.8 mmol/L     Narrative:      Result may be elevated if tourniquet was used during collection.    Comprehensive metabolic panel [[id number]]  (Abnormal) Collected: 11/22/24 1425    Lab Status: Final result Specimen: Blood from Arm, Left Updated: 11/22/24 1447     Sodium 137 mmol/L      Potassium 3.7 mmol/L      Chloride 103 mmol/L      CO2 25 mmol/L      ANION GAP 9 mmol/L      BUN 11 mg/dL      Creatinine  0.85 mg/dL      Glucose 92 mg/dL      Calcium 9.4 mg/dL      AST 15 U/L      ALT 26 U/L      Alkaline Phosphatase 62 U/L      Total Protein 7.6 g/dL      Albumin 4.7 g/dL      Total Bilirubin 1.18 mg/dL      eGFR 123 ml/min/1.73sq m     Narrative:      National Kidney Disease Foundation guidelines for Chronic Kidney Disease (CKD):     Stage 1 with normal or high GFR (GFR > 90 mL/min/1.73 square meters)    Stage 2 Mild CKD (GFR = 60-89 mL/min/1.73 square meters)    Stage 3A Moderate CKD (GFR = 45-59 mL/min/1.73 square meters)    Stage 3B Moderate CKD (GFR = 30-44 mL/min/1.73 square meters)    Stage 4 Severe CKD (GFR = 15-29 mL/min/1.73 square meters)    Stage 5 End Stage CKD (GFR <15 mL/min/1.73 square meters)  Note: GFR calculation is accurate only with a steady state creatinine    Lipase [[id number]]  (Normal) Collected: 11/22/24 1425    Lab Status: Final result Specimen: Blood from Arm, Left Updated: 11/22/24 1447     Lipase 24 u/L     CBC and differential [[id number]]  (Abnormal) Collected: 11/22/24 1425    Lab Status: Final result Specimen: Blood from Arm, Left Updated: 11/22/24 1432     WBC 15.39 Thousand/uL      RBC 5.93 Million/uL      Hemoglobin 16.2 g/dL      Hematocrit 49.4 %      MCV 83 fL      MCH 27.3 pg      MCHC 32.8 g/dL      RDW 13.3 %      MPV 10.3 fL      Platelets 253 Thousands/uL      nRBC 0 /100 WBCs      Segmented % 76 %      Immature Grans % 1 %      Lymphocytes % 13 %      Monocytes % 8 %      Eosinophils Relative 2 %      Basophils Relative 0 %      Absolute Neutrophils 11.81 Thousands/µL      Absolute Immature Grans 0.07 Thousand/uL      Absolute Lymphocytes 1.99 Thousands/µL      Absolute Monocytes 1.22 Thousand/µL      Eosinophils Absolute 0.27 Thousand/µL      Basophils Absolute 0.03 Thousands/µL             CT abdomen pelvis with contrast   Final Interpretation by Shukri Lawrence MD (11/22 1534)      Acute uncomplicated appendicitis.         I personally discussed this study  with KVNG BOSWELL on 11/22/2024 3:30 PM.            Workstation performed: [id number]             Procedures    ED Medication and Procedure Management   None     Patient's Medications    No medications on file     No discharge procedures on file.  ED SEPSIS DOCUMENTATION   Time reflects when diagnosis was documented in both MDM as applicable and the Disposition within this note       Time User Action Codes Description Comment    11/22/2024  3:37 PM Kvng Boswell Add [K35.80] Acute appendicitis                  Kvng Boswell PA-C  11/22/24 1624

## 2024-11-22 NOTE — ANESTHESIA POSTPROCEDURE EVALUATION
Post-Op Assessment Note    CV Status:  Stable  Pain Score: 0    Pain management: adequate       Mental Status:  Lethargic   Hydration Status:  Stable and euvolemic   PONV Controlled:  None   Airway Patency:  Patent and adequate     Post Op Vitals Reviewed: Yes    No anethesia notable event occurred.    Staff: CRNA           Last Filed PACU Vitals:  Vitals Value Taken Time   Temp 98.5 °F (36.9 °C) 11/22/24 1850   Pulse 83 11/22/24 1850   /51 11/22/24 1850   Resp 16 11/22/24 1850   SpO2 99 % 11/22/24 1850       Modified Harriet:  Activity: 0 (11/22/2024  6:50 PM)  Respiration: 2 (11/22/2024  6:50 PM)  Circulation: 2 (11/22/2024  6:50 PM)  Consciousness: 0 (11/22/2024  6:50 PM)  Oxygen Saturation: 1 (11/22/2024  6:50 PM)  Modified Harriet Score: 5 (11/22/2024  6:50 PM)

## 2024-11-22 NOTE — PATIENT INSTRUCTIONS
Follow up with PCP in 3-5 days.  Proceed to  ER     If tests have been performed at [facility], our office will contact you with results if changes need to be made to the care plan discussed with you at the visit.  You can review your full results on [facility] MyChart.

## 2024-11-22 NOTE — PROGRESS NOTES
[facility]        NAME: Parth Allen is a 23 y.o. male  : 2000    MRN: [id number]  DATE: 2024  TIME: 12:44 PM    Assessment and Plan   Right lower quadrant abdominal pain [R10.31]  1. Right lower quadrant abdominal pain  Transfer to other facility            Patient Instructions       Follow up with PCP in 3-5 days.  Proceed to  ER     If tests have been performed at [facility], our office will contact you with results if changes need to be made to the care plan discussed with you at the visit.  You can review your full results on [facility].    Chief Complaint     Chief Complaint   Patient presents with    Abdominal Pain     RLQ pain since last night, did not take anything for the pain           History of Present Illness       Denies history of IBD, pyelonephritis or renal calculi. LNBM last night.    Abdominal Pain  This is a new problem. Episode onset: 10pm. The onset quality is sudden. The problem occurs constantly. The problem has been gradually worsening since onset. The pain is located in the RLQ. The pain is at a severity of 8/10. The pain does not radiate. Pertinent negatives include no constipation, diarrhea, dysuria, fever, frequency, hematuria, nausea or vomiting. Nothing relieves the symptoms. Past treatments include nothing.       Review of Systems   Review of Systems   Constitutional:  Negative for chills and fever.   Respiratory:  Negative for shortness of breath.    Cardiovascular:  Negative for chest pain and palpitations.   Gastrointestinal:  Positive for abdominal pain. Negative for blood in stool, constipation, diarrhea, nausea and vomiting.   Genitourinary:  Negative for dysuria, frequency, hematuria and urgency.         Current Medications     No current outpatient medications on file.    Current Allergies     Allergies as of 2024 - Reviewed 2024   Allergen Reaction Noted    Pollen extract Other (See Comments) 02/15/2016            The  following portions of the patient's history were reviewed and updated as appropriate: allergies, current medications, past family history, past medical history, past social history, past surgical history and problem list.     History reviewed. No pertinent past medical history.    History reviewed. No pertinent surgical history.    History reviewed. No pertinent family history.      Medications have been verified.        Objective   /84   Pulse (!) 114   Temp 99.4 °F (37.4 °C)   Resp 18   SpO2 97%   No LMP for male patient.       Physical Exam     Physical Exam  Constitutional:       General: He is not in acute distress.     Appearance: He is well-developed. He is not diaphoretic.   Cardiovascular:      Rate and Rhythm: Normal rate and regular rhythm.      Heart sounds: No murmur heard.     No friction rub. No gallop.   Pulmonary:      Effort: Pulmonary effort is normal. No respiratory distress.      Breath sounds: Normal breath sounds. No wheezing, rhonchi or rales.   Abdominal:      General: Bowel sounds are normal. There is no distension.      Palpations: Abdomen is soft. There is no mass.      Tenderness: There is abdominal tenderness (RUQ and RLL) in the right upper quadrant and right lower quadrant. There is rebound. There is no right CVA tenderness, left CVA tenderness or guarding. Positive signs include Rovsing's sign, McBurney's sign, psoas sign and obturator sign.      Comments: Negative CVA tenderness     Lymphadenopathy:      Cervical: No cervical adenopathy.   Skin:     General: Skin is warm.   Neurological:      Mental Status: He is alert.   Psychiatric:         Behavior: Behavior normal.         Thought Content: Thought content normal.         Judgment: Judgment normal.

## 2024-11-22 NOTE — ANESTHESIA PREPROCEDURE EVALUATION
Procedure:  APPENDECTOMY LAPAROSCOPIC (Abdomen)    Relevant Problems   No relevant active problems        Physical Exam    Airway    Mallampati score: I         Dental       Cardiovascular      Pulmonary      Other Findings        Anesthesia Plan  ASA Score- 1 Emergent    Anesthesia Type- general with ASA Monitors.         Additional Monitors:     Airway Plan: ETT.           Plan Factors-    Chart reviewed.                      Induction- intravenous.    Postoperative Plan- Plan for postoperative opioid use. Planned trial extubation        Informed Consent- Anesthetic plan and risks discussed with patient.  I personally reviewed this patient with the CRNA. Discussed and agreed on the Anesthesia Plan with the CRNA..

## 2024-11-22 NOTE — OP NOTE
OPERATIVE REPORT  PATIENT NAME: Parth Allen    :  2000  MRN: [id number]  Pt Location: OW OR ROOM 01    SURGERY DATE: 2024    Surgeons and Role:     * Maritza Dye DO - Primary     * Katrina Elizabeth Billig, PA-C - Assisting    Preop Diagnosis:  Acute appendicitis [K35.80]    Post-Op Diagnosis Codes:     * Acute appendicitis [K35.80]    Procedure(s):  APPENDECTOMY LAPAROSCOPIC    Specimen(s):  ID Type Source Tests Collected by Time Destination   1 :  Tissue Appendix TISSUE EXAM Maritzatracie Tariqbilly Dye DO 2024  6:15 PM        Estimated Blood Loss:   Minimal    Drains:  NG/OG/Enteral Tube Orogastric 18 Fr Center mouth (Active)   Number of days: 0       Anesthesia Type:   General    Operative Indications:  Acute appendicitis [K35.80]      Operative Findings:  Acute suppurative appendicitis without perforation or gangrene.  The appendix was located adherent to the retroperitoneum in the retrocecal location      Complications:   None    Procedure and Technique:  The patient is brought to the operating room.  A time-out was held the patient identified and procedure verified.  Appropriate antibiotic prophylaxis and DVT prophylaxis was used.  General anesthesia was administered.  The area of the abdomen was prepped and draped in usual sterile fashion using chlorhexidine solution.  Local anesthesia using 0.25% Marcaine with epinephrine was infiltrated in the supraumbilical area.  The skin was grasped and elevated using towel clamps.  An incision was made using 11 blade scalpel.  A Veress needle was placed and confirmed to be intraperitoneal using a saline drop test.  The abdomen was insufflated to 15 mmHg intra-abdominal pressure.  A 5 mm trocar was placed.  The abdomen was inspected there were no adhesions noted.  An additional 5 mm trocar was placed in the left lateral abdomen under direct visualization and after the infiltration of local anesthesia.  An additional 5 mm trocar was  placed in the suprapubic area in the same manner.  The patient was rotated towards his left side.  The appendix was found to be in a retrocecal location adherent to the retroperitoneum.  There were attachments of the terminal ileum to the retroperitoneum.  These had to be taken down in order to retract the terminal ileum medially.  This dissection was then carried along the white line of Toldt to free the cecum.  The attachments of the appendix the retroperitoneum were then taken down using the harmonic.  A window was created between the appendix and the mesoappendix using a Lety dissector.  The harmonic was then used to divide the mesoappendix.  Once the appendix was isolated, 3 Endoloops were placed on the appendiceal base.  The appendix was then divided leaving 2 Endoloops on the appendiceal stump.  The appendix was placed within a 5 mm Endo Catch bag.  The right lower quadrant and pelvis were irrigated until clean.  The appendix was removed through the left lower quadrant trocar site.  All trocars were removed and the abdomen was desufflated.  Skin was closed using 4 0 Vicryl in a subcuticular layer.  The patient tolerated the procedure well transferred to recovery in stable condition.  At the end the procedure all needle instrument sponge counts were correct x2.    I was present for the entire procedure. and A physician assistant was required during the procedure for retraction, tissue handling, dissection and suturing.    Patient Disposition:  PACU     This procedure was not performed to treat colon cancer through resection           SIGNATURE: Maritza Dye,   DATE: November 22, 2024  TIME: 6:34 PM

## 2024-11-22 NOTE — ED NOTES
PT was transported to OR, no s/s of distress, VS stable, A&Ox4, ID band in place.      Vonda Escobar RN  11/22/24 1216

## 2024-11-22 NOTE — LETTER
[facility]  [address]  [place] [address]  [phone]    November 23, 2024     Patient: Parth Allen   YOB: 2000   Date of Visit: 11/22/2024       To Whom it May Concern:    Parth Allen is under my professional care. He was seen in the hospital from 11/22/2024 to 11/23/24. He  has a lifting restriction of 10 pounds x 2 weeks through 12/6. He will be reassessed at that time to determine any further limitations  .    If you have any questions or concerns, please don't hesitate to call.    Sincerely,              Katrina Billig, PA-C

## 2024-11-23 VITALS
BODY MASS INDEX: 36.3 KG/M2 | SYSTOLIC BLOOD PRESSURE: 129 MMHG | HEIGHT: 72 IN | DIASTOLIC BLOOD PRESSURE: 71 MMHG | HEART RATE: 89 BPM | TEMPERATURE: 98.2 F | RESPIRATION RATE: 18 BRPM | WEIGHT: 268 LBS | OXYGEN SATURATION: 93 %

## 2024-11-23 LAB
ANION GAP SERPL CALCULATED.3IONS-SCNC: 9 MMOL/L (ref 4–13)
BASOPHILS # BLD AUTO: 0.01 THOUSANDS/ÂΜL (ref 0–0.1)
BASOPHILS NFR BLD AUTO: 0 % (ref 0–1)
BUN SERPL-MCNC: 13 MG/DL (ref 5–25)
CALCIUM SERPL-MCNC: 9.1 MG/DL (ref 8.4–10.2)
CHLORIDE SERPL-SCNC: 105 MMOL/L (ref 96–108)
CO2 SERPL-SCNC: 23 MMOL/L (ref 21–32)
CREAT SERPL-MCNC: 0.81 MG/DL (ref 0.6–1.3)
EOSINOPHIL # BLD AUTO: 0.01 THOUSAND/ÂΜL (ref 0–0.61)
EOSINOPHIL NFR BLD AUTO: 0 % (ref 0–6)
ERYTHROCYTE [DISTWIDTH] IN BLOOD BY AUTOMATED COUNT: 13.3 % (ref 11.6–15.1)
GFR SERPL CREATININE-BSD FRML MDRD: 126 ML/MIN/1.73SQ M
GLUCOSE SERPL-MCNC: 130 MG/DL (ref 65–140)
HCT VFR BLD AUTO: 43.4 % (ref 36.5–49.3)
HGB BLD-MCNC: 14.4 G/DL (ref 12–17)
IMM GRANULOCYTES # BLD AUTO: 0.14 THOUSAND/UL (ref 0–0.2)
IMM GRANULOCYTES NFR BLD AUTO: 1 % (ref 0–2)
LYMPHOCYTES # BLD AUTO: 0.92 THOUSANDS/ÂΜL (ref 0.6–4.47)
LYMPHOCYTES NFR BLD AUTO: 6 % (ref 14–44)
MCH RBC QN AUTO: 27.6 PG (ref 26.8–34.3)
MCHC RBC AUTO-ENTMCNC: 33.2 G/DL (ref 31.4–37.4)
MCV RBC AUTO: 83 FL (ref 82–98)
MONOCYTES # BLD AUTO: 0.62 THOUSAND/ÂΜL (ref 0.17–1.22)
MONOCYTES NFR BLD AUTO: 4 % (ref 4–12)
NEUTROPHILS # BLD AUTO: 13.6 THOUSANDS/ÂΜL (ref 1.85–7.62)
NEUTS SEG NFR BLD AUTO: 89 % (ref 43–75)
NRBC BLD AUTO-RTO: 0 /100 WBCS
PLATELET # BLD AUTO: 237 THOUSANDS/UL (ref 149–390)
PMV BLD AUTO: 10.5 FL (ref 8.9–12.7)
POTASSIUM SERPL-SCNC: 4.1 MMOL/L (ref 3.5–5.3)
RBC # BLD AUTO: 5.22 MILLION/UL (ref 3.88–5.62)
SODIUM SERPL-SCNC: 137 MMOL/L (ref 135–147)
WBC # BLD AUTO: 15.3 THOUSAND/UL (ref 4.31–10.16)

## 2024-11-23 PROCEDURE — 80048 BASIC METABOLIC PNL TOTAL CA: CPT | Performed by: STUDENT IN AN ORGANIZED HEALTH CARE EDUCATION/TRAINING PROGRAM

## 2024-11-23 PROCEDURE — 85025 COMPLETE CBC W/AUTO DIFF WBC: CPT | Performed by: STUDENT IN AN ORGANIZED HEALTH CARE EDUCATION/TRAINING PROGRAM

## 2024-11-23 PROCEDURE — 99024 POSTOP FOLLOW-UP VISIT: CPT

## 2024-11-23 NOTE — NURSING NOTE
Patient discharged with all personal belongings. Patient verbalized understanding of discharge instructions. No PIV in place. Patient ambulated off unit by RN for discharge.

## 2024-11-23 NOTE — UTILIZATION REVIEW
NOTIFICATION OF INPATIENT ADMISSION   AUTHORIZATION REQUEST   SERVICING FACILITY:   [place] [address]  Tax ID: [id number]  NPI: [id number] ATTENDING PROVIDER:  Attending Name and NPI#: Maritza Dye Do [[id number]]  Address: [address] [place] [address]  Phone: [phone]   ADMISSION INFORMATION:  Place of Service: Inpatient [facility]  Place of Service Code: 21  Inpatient Admission Date/Time: 11/22/24  3:59 PM  Discharge Date/Time: 11/23/2024 11:05 AM  Admitting Diagnosis Code/Description:  Abdominal pain [R10.9]  Acute appendicitis [K35.80]     UTILIZATION REVIEW CONTACT:  Holly Brady Utilization   Network Utilization Review Department  Phone: [phone]  Fax [phone]  Email: [email or web address]  Contact for approvals/pending authorizations, clinical reviews, and discharge.     PHYSICIAN ADVISORY SERVICES:  Medical Necessity Denial & Qvxm-vc-Loim Review  Phone: [phone]  Fax: [phone]  Email: [email or web address]     DISCHARGE SUPPORT TEAM:  For Patients Discharge Needs & Updates  Phone: [phone] opt. 2 Fax: [phone]  Email: [email or web address]

## 2024-11-23 NOTE — DISCHARGE INSTR - AVS FIRST PAGE
Laparoscopic Appendectomy   WHAT YOU NEED TO KNOW:   Laparoscopic appendectomy is surgery to remove your appendix.     DISCHARGE INSTRUCTIONS:   Call Dr. Dye's office at [phone] with any questions or concerns    Medicines:  You may need any of the following:  Prescription pain medicine - Take as directed    You may also take tylenol as needed    Take your medicine as directed.  Contact your healthcare provider if you think your medicine is not helping or if you have side effects. Tell her if you are allergic to any medicine. Keep a list of the medicines, vitamins, and herbs you take. Include the amounts, and when and why you take them. Bring the list or the pill bottles to follow-up visits. Carry your medicine list with you in case of an emergency.    Follow up with your healthcare provider 2 weeks after surgery, or as directed:  Write down your questions so you remember to ask them during your visits.  Wound care:  You may shower and pat the incisions dry.  Do not soak underwater for 2 weeks   What to eat after surgery:  You may eat whatever you feel up to following surgery. Drink plenty of liquids.   When to return to work and other activities:  No lifting, pushing, or pulling greater then 10lb for 2 weeks.  Walk as much as possible.  YOU may drive when you are comfortable enough to turn and press the brake without pain medication    Contact your healthcare provider if:   You have a fever over 101°F (38°C) or chills.     You have pain or nausea that is not relieved by medicine.     You have redness and swelling around your incisions, or blood or pus is leaking from your incisions.     You are constipated or have diarrhea.     You have questions or concerns about your surgery, condition, or care.  Seek care immediately or call 911 if:   You cannot stop vomiting.     Your bowel movements are black or bloody.     You have pain in your abdomen and it is swollen or hard.     Your arm or leg feels warm,  tender, and painful. It may look swollen and red.    You feel lightheaded, short of breath, and have chest pain.     You cough up blood.  © 2017 [employer] Information is for End User's use only and may not be sold, redistributed or otherwise used for commercial purposes. All illustrations and images included in CareNotes® are the copyrighted property of [employer], [employer]. or [employer].  The above information is an  only. It is not intended as medical advice for individual conditions or treatments. Talk to your doctor, nurse or pharmacist before following any medical regimen to see if it is safe and effective for you.

## 2024-11-23 NOTE — DISCHARGE SUMMARY
Discharge Summary - Surgery-General   Name: Parth Allen 23 y.o. male I MRN: [id number]  Unit/Bed#: -01 I Date of Admission: 11/22/2024   Date of Service: 11/22/2024 I Hospital Day: 0    Admission Date: 11/22/2024 1409  Discharge Date: 11/22/24  Admitting Diagnosis: Abdominal pain [R10.9]  Acute appendicitis [K35.80]  Discharge Diagnosis:   Medical Problems       Resolved Problems  Date Reviewed: 4/24/2023   None       HPI: Per H&P performed 11/22:  Parth Allen is a 23 y.o. male who presents with abdominal pain.  The patient states about 10 PM last night he developed vague abdominal pain.  Today his pain worsened and settled more in the lower abdomen.  His pain is worsened with movement.  He denies any nausea or vomiting.  He did attempt to eat some soup at 1:00 this afternoon however had no appetite.  He denies any fever or chills..     Procedures Performed: Laparoscopic Appendectomy by Dr. Dye 11/22    Summary of Hospital Course: Patient presented 11/22 with above complaint.  Above procedure was performed with below findings. POD1 patient was doing well, urinating without issue, tolerating clear liquid diet, ambulating, and pain was well-controlled.  Since surgery patient required no pain medication.  He is placed on a regular diet for breakfast and with tolerance was deemed stable for discharge.  Discharge instructions reviewed with patient and he is aware of lifting restriction, shower information, and driving restriction.  Work note provided for patient.  All questions answered.  Office phone number provided on discharge paperwork for questions/concerns.    Significant Findings, Care, Treatment and Services Provided: Acute suppurative appendicitis without perforation or gangrene. The appendix was located adherent to the retroperitoneum in the retrocecal location     Complications: None    Condition at Discharge: good     Day of Discharge Exam:   General: no acute distress, pt appears well  and comfortable, awake in bed  Skin: warm and dry to touch  Pulmonary: normal effort  Abdominal: soft, non-distended abdomen, non-tender to palpation, no guarding or rebound, 3 incision sites are clean/dry/intact with glue in place  Musculoskeletal: no LE edema present  Neuro: alert and oriented     Discharge instructions/Information to patient and family:   See After Visit Summary (AVS) for information provided to patient and family.      Provisions for Follow-Up Care:  See after visit summary for information related to follow-up care and any pertinent home health orders.      PCP: No primary care provider on file.    Disposition: Home    Planned Readmission: No     Discharge Medications:  See after visit summary for reconciled discharge medications provided to patient and family.      Discharge Statement:  I have spent a total time of 20 minutes in caring for this patient on the day of the visit/encounter.    Katrina Elizabeth Billig, PA-C  11/22/2024

## 2024-11-23 NOTE — UTILIZATION REVIEW
Initial Clinical Review    Admission: Date/Time/Statement:   Admission Orders (From admission, onward)       Ordered        11/22/24 1559  INPATIENT ADMISSION  Once                          Orders Placed This Encounter   Procedures    INPATIENT ADMISSION     Standing Status:   Standing     Number of Occurrences:   1     Level of Care:   Med Surg [16]     Estimated length of stay:   More than 2 Midnights     Certification:   I certify that inpatient services are medically necessary for this patient for a duration of greater than two midnights. See H&P and MD Progress Notes for additional information about the patient's course of treatment.     ED Arrival Information       Expected   11/22/2024 12:43    Arrival   11/22/2024 14:05    Acuity   Urgent              Means of arrival   Walk-In    Escorted by   Self    Service   Surgery-General    Admission type   Emergency              Arrival complaint   abd pain             Chief Complaint   Patient presents with    Abdominal Pain     Reports RLQ and R flank pain that began last night, pain has increased since. Reports intermittent throbbing, 7/10 pain depending on position. Denies n/v/d or fever. Denies urinary complaints.       Initial Presentation: 23 y.o. male presented to the ED from home with complaint of abdominal pain that began around 10 PM last night, worsened today and settled more in the lower abdomen.  ED imaging revealed acute appendicitis.  Patient was given IVF and IV Toradol in the ED.  Exam:  AOX3. Tenderness, R lateral abdomen.    11/22 Inpatient admission for evaluation and treatment of acute appendicitis:  NPO, IVF, to OR for laparoscopic appendectomy, Ancef and Flagyl on call.     11/22 Operative Report:      Procedure(s): APPENDECTOMY LAPAROSCOPIC  Anesthesia: General   Operative Findings:    Acute suppurative appendicitis without perforation or gangrene.  The appendix was located adherent to the retroperitoneum in the retrocecal location    11/23  General Surgery:   POD1 patient was doing well, urinating without issue, tolerating clear liquid diet, ambulating, and pain was well-controlled.  Since surgery patient required no pain medication.  He is placed on a regular diet for breakfast and with tolerance was deemed stable for discharge.  Discharge instructions reviewed with patient and he is aware of lifting restriction, shower information, and driving restriction. All questions answered.      11/23 1105 Discharged to home/self care.     ED Treatment-Medication Administration from 11/22/2024 1243 to 11/22/2024 1633         Date/Time Order Dose Route Action     11/22/2024 1427 sodium chloride 0.9 % bolus 1,000 mL 1,000 mL Intravenous New Bag     11/22/2024 1428 ketorolac (TORADOL) injection 15 mg 15 mg Intravenous Given     11/22/2024 1451 iohexol (OMNIPAQUE) 350 MG/ML injection (MULTI-DOSE) 100 mL 100 mL Intravenous Given            Scheduled Medications:    acetaminophen (Ofirmev) injection 1,000 mg  Dose: 1,000 mg  Freq: Once Route: IV  Start: 11/22/24 1700 End: 11/22/24 1729       Continuous IV Infusions:    sodium chloride 0.9 % infusion  Rate: 100 mL/hr Dose: 100 mL/hr  Freq: Continuous Route: IV  Last Dose: Stopped (11/23/24 0454)  Start: 11/22/24 1630 End: 11/23/24 0700     PRN Meds: None given.     acetaminophen, 650 mg, Oral, Q6H PRN  ibuprofen, 600 mg, Oral, Q6H PRN      ED Triage Vitals [11/22/24 1411]   Temperature Pulse Respirations Blood Pressure SpO2 Pain Score   98.5 °F (36.9 °C) (!) 114 19 131/79 99 % 7     Weight (last 2 days) before discharge       Date/Time Weight    11/22/24 1643 122 (268)    11/22/24 1411 122 (268.3)            Vital Signs (last 3 days) before discharge       Date/Time Temp Pulse Resp BP MAP (mmHg) SpO2 O2 Flow Rate (L/min) O2 Device Patient Position - Orthostatic VS [place] Coma Scale Score Pain    11/23/24 08:43:14 98.2 °F (36.8 °C) 89 18 129/71 90 93 % -- -- -- -- --    11/23/24 0747 -- -- -- -- -- 93 % -- None (Room  air) -- 15 --    11/23/24 0746 -- -- -- -- -- -- -- -- -- -- 3    11/22/24 23:15:30 97.7 °F (36.5 °C) -- 18 133/72 92 -- -- -- -- -- --    11/22/24 19:49:32 97.9 °F (36.6 °C) 80 -- 119/74 89 93 % -- -- -- 15 2    11/22/24 1935 98 °F (36.7 °C) 80 16 101/56 74 94 % -- None (Room air) -- 15 No Pain    11/22/24 1920 -- 79 16 109/55 77 93 % -- None (Room air) -- 15 No Pain    11/22/24 1915 -- 91 18 108/55 -- 94 % -- None (Room air) -- 13 No Pain    11/22/24 1905 -- 86 16 103/56 -- 95 % -- None (Room air) -- 10 No Pain    11/22/24 1850 98.5 °F (36.9 °C) 83 16 104/51 73 99 % 6 L/min Simple mask -- 6 --    11/22/24 1643 98.1 °F (36.7 °C) 98 18 122/74 -- 98 % -- None (Room air) -- -- No Pain    11/22/24 1600 -- 90 -- 122/56 81 99 % -- -- -- -- --    11/22/24 1545 -- 93 -- 140/65 94 98 % -- -- -- -- --    11/22/24 1430 -- 99 -- -- -- 97 % -- -- -- 15 --    11/22/24 1428 -- -- -- -- -- -- -- -- -- -- 7    11/22/24 1415 -- 109 -- 131/79 101 98 % -- -- -- -- --    11/22/24 1411 98.5 °F (36.9 °C) 114 19 131/79 -- 99 % -- None (Room air) Sitting -- 7              Pertinent Labs/Diagnostic Test Results:       Radiology:      CT abdomen pelvis with contrast   Final Interpretation by Shukri Lawrence MD (11/22 1534)      Acute uncomplicated appendicitis.         I personally discussed this study with KVNG BOSWELL on 11/22/2024 3:30 PM.            Workstation performed: [id number]                   Results from last 7 days   Lab Units 11/23/24  0450 11/22/24  1425   WBC Thousand/uL 15.30* 15.39*   HEMOGLOBIN g/dL 14.4 16.2   HEMATOCRIT % 43.4 49.4*   PLATELETS Thousands/uL 237 253   TOTAL NEUT ABS Thousands/µL 13.60* 11.81*         Results from last 7 days   Lab Units 11/23/24  0450 11/22/24  1425   SODIUM mmol/L 137 137   POTASSIUM mmol/L 4.1 3.7   CHLORIDE mmol/L 105 103   CO2 mmol/L 23 25   ANION GAP mmol/L 9 9   BUN mg/dL 13 11   CREATININE mg/dL 0.81 0.85   EGFR ml/min/1.73sq m 126 123   CALCIUM mg/dL 9.1 9.4      Results from last 7 days   Lab Units 11/22/24  1425   AST U/L 15   ALT U/L 26   ALK PHOS U/L 62   TOTAL PROTEIN g/dL 7.6   ALBUMIN g/dL 4.7   TOTAL BILIRUBIN mg/dL 1.18*         Results from last 7 days   Lab Units 11/23/24  0450 11/22/24  1425   GLUCOSE RANDOM mg/dL 130 92           Results from last 7 days   Lab Units 11/22/24  1425   LACTIC ACID mmol/L 0.8             Results from last 7 days   Lab Units 11/22/24  1425   LIPASE u/L 24                 Results from last 7 days   Lab Units 11/22/24  1556   CLARITY UA  Clear   COLOR UA  Yellow   SPEC GRAV UA  <=1.005   PH UA  7.0   GLUCOSE UA mg/dl Negative   KETONES UA mg/dl Negative   BLOOD UA  Negative   PROTEIN UA mg/dl Negative   NITRITE UA  Negative   BILIRUBIN UA  Negative   UROBILINOGEN UA E.U./dl 0.2   LEUKOCYTES UA  Negative       History reviewed. No pertinent past medical history.        Admitting Diagnosis: Abdominal pain [R10.9]  Acute appendicitis [K35.80]  Age/Sex: 23 y.o. male        Network Utilization Review Department  ATTENTION: Please call with any questions or concerns to [phone] and carefully listen to the prompts so that you are directed to the right person. All voicemails are confidential.   For Discharge needs, contact Care Management DC Support Team at [phone] opt. 2  Send all requests for admission clinical reviews, approved or denied determinations and any other requests to dedicated fax number below belonging to the campus where the patient is receiving treatment. List of dedicated fax numbers for the Facilities:  FACILITY NAME UR FAX NUMBER   ADMISSION DENIALS (Administrative/Medical Necessity) [phone]   DISCHARGE SUPPORT TEAM (NETWORK) [phone]   PARENT CHILD HEALTH (Maternity/NICU/Pediatrics) [phone]   [facility] [phone]   [facility] [phone]   [facility] [phone]   [facility]  [phone]   [facility] [phone]   [facility] [phone]   [facility] [phone]   [facility] [phone]   [facility] [phone]   [facility] [phone]   [facility] [phone]   [facility] [phone]

## 2024-11-23 NOTE — NURSING NOTE
Pt IV occluded, pt refusing to be stuck again. Educated pt on importance of having IV, still refusing at this time. Dr. Doshi contacted, okay to hold pts IVF an reevaluate during day.

## 2024-11-24 NOTE — ANESTHESIA POSTPROCEDURE EVALUATION
Post-Op Assessment Note    CV Status:  Stable    Pain management: adequate       Mental Status:  Alert and awake   Hydration Status:  Euvolemic   PONV Controlled:  Controlled   Airway Patency:  Patent     Post Op Vitals Reviewed: Yes    No anethesia notable event occurred.    Staff: Anesthesiologist           Last Filed PACU Vitals:  Vitals Value Taken Time   Temp 98 °F (36.7 °C) 11/22/24 1935   Pulse 80 11/22/24 1935   /56 11/22/24 1935   Resp 16 11/22/24 1935   SpO2 94 % 11/22/24 1935       Modified Harriet:  No data recorded

## 2024-11-25 NOTE — UTILIZATION REVIEW
NOTIFICATION OF ADMISSION DISCHARGE   This is a Notification of Discharge from [facility]. Please be advised that this patient has been discharge from our facility. Below you will find the admission and discharge date and time including the patient’s disposition.   UTILIZATION REVIEW CONTACT:  Holly Brady  Utilization   Network Utilization Review Department  Phone: [phone] x carefully listen to the prompts. All voicemails are confidential.  Email: [email or web address]     ADMISSION INFORMATION  PRESENTATION DATE: 11/22/2024  2:09 PM  OBERVATION ADMISSION DATE: N/A  INPATIENT ADMISSION DATE: 11/22/24  3:59 PM   DISCHARGE DATE: 11/23/2024 11:05 AM   DISPOSITION:Home/Self Care    Network Utilization Review Department  ATTENTION: Please call with any questions or concerns to [phone] and carefully listen to the prompts so that you are directed to the right person. All voicemails are confidential.   For Discharge needs, contact Care Management DC Support Team at [phone] opt. 2  Send all requests for admission clinical reviews, approved or denied determinations and any other requests to dedicated fax number below belonging to the campus where the patient is receiving treatment. List of dedicated fax numbers for the Facilities:  FACILITY NAME UR FAX NUMBER   ADMISSION DENIALS (Administrative/Medical Necessity) [phone]   DISCHARGE SUPPORT TEAM ([facility]) [phone]   PARENT CHILD HEALTH (Maternity/NICU/Pediatrics) [phone]   [facility] [phone]   [facility] [phone]   [facility] [phone]   [facility] [phone]   [facility] [phone]   [facility] [phone]   [facility] [phone]   [facility]  [phone]   [facility] [phone]   [facility] [phone]   [facility] [phone]   [facility] [phone]

## 2024-11-29 PROCEDURE — 88304 TISSUE EXAM BY PATHOLOGIST: CPT | Performed by: STUDENT IN AN ORGANIZED HEALTH CARE EDUCATION/TRAINING PROGRAM

## (undated) DEVICE — SUT VICRYL PLUS 0 54IN VCP287G

## (undated) DEVICE — TOWEL SURG XR DETECT GREEN STRL RFD

## (undated) DEVICE — TROCAR: Brand: KII SLEEVE

## (undated) DEVICE — EXOFIN PRECISION PEN HIGH VISCOSITY TOPICAL SKIN ADHESIVE: Brand: EXOFIN PRECISION PEN, 1G

## (undated) DEVICE — SINGLE PORT MANIFOLD: Brand: NEPTUNE 2

## (undated) DEVICE — HARMONIC ACE 5MM DIAMETER SHEARS 36CM SHAFT LENGTH + ADAPTIVE TISSUE TECHNOLOGY FOR USE WITH GENERATOR G11: Brand: HARMONIC ACE

## (undated) DEVICE — INTENDED FOR TISSUE SEPARATION, AND OTHER PROCEDURES THAT REQUIRE A SHARP SURGICAL BLADE TO PUNCTURE OR CUT.: Brand: BARD-PARKER SAFETY BLADES SIZE 11, STERILE

## (undated) DEVICE — ANTIBACTERIAL UNDYED BRAIDED (POLYGLACTIN 910), SYNTHETIC ABSORBABLE SUTURE: Brand: COATED VICRYL

## (undated) DEVICE — INSUFFLATION NEEDLE TO ESTABLISH PNEUMOPERITONEUM.: Brand: INSUFFLATION NEEDLE

## (undated) DEVICE — PDS II VLT 0 107CM AG ST3: Brand: ENDOLOOP

## (undated) DEVICE — HARMONIC 700 SHEARS, ADVANCED HEMOSTASIS: Brand: HARMONIC

## (undated) DEVICE — GLOVE INDICATOR PI UNDERGLOVE SZ 6.5 BLUE

## (undated) DEVICE — ALLENTOWN LAP CHOLE APP PACK: Brand: CARDINAL HEALTH

## (undated) DEVICE — TUBING INSUFFLATION DISP NON-HEATED

## (undated) DEVICE — DRAPE EQUIPMENT RF WAND

## (undated) DEVICE — SUT VICRYL 4-0 PS-2 18 IN J496G

## (undated) DEVICE — DRAPE SURGIKIT SADDLE BAG LAP

## (undated) DEVICE — BLUE HEAT SCOPE WARMER

## (undated) DEVICE — SCD SEQUENTIAL COMPRESSION COMFORT SLEEVE MEDIUM KNEE LENGTH: Brand: KENDALL SCD

## (undated) DEVICE — PAD GROUNDING DUAL ADULT

## (undated) DEVICE — CHLORAPREP HI-LITE 26ML ORANGE